# Patient Record
Sex: MALE | Race: WHITE | NOT HISPANIC OR LATINO | Employment: FULL TIME | URBAN - METROPOLITAN AREA
[De-identification: names, ages, dates, MRNs, and addresses within clinical notes are randomized per-mention and may not be internally consistent; named-entity substitution may affect disease eponyms.]

---

## 2017-04-09 ENCOUNTER — HOSPITAL ENCOUNTER (EMERGENCY)
Facility: CLINIC | Age: 47
Discharge: HOME | End: 2017-04-09
Attending: EMERGENCY MEDICINE | Admitting: EMERGENCY MEDICINE
Payer: COMMERCIAL

## 2017-04-09 ENCOUNTER — APPOINTMENT (OUTPATIENT)
Dept: GENERAL RADIOLOGY | Facility: CLINIC | Age: 47
End: 2017-04-09
Attending: EMERGENCY MEDICINE
Payer: COMMERCIAL

## 2017-04-09 VITALS
RESPIRATION RATE: 18 BRPM | SYSTOLIC BLOOD PRESSURE: 139 MMHG | WEIGHT: 235 LBS | BODY MASS INDEX: 31 KG/M2 | TEMPERATURE: 98.2 F | DIASTOLIC BLOOD PRESSURE: 85 MMHG | OXYGEN SATURATION: 99 %

## 2017-04-09 DIAGNOSIS — S86.912A STRAIN OF LEFT KNEE, INITIAL ENCOUNTER: ICD-10-CM

## 2017-04-09 PROCEDURE — 73560 X-RAY EXAM OF KNEE 1 OR 2: CPT | Mod: LT

## 2017-04-09 PROCEDURE — 99283 EMERGENCY DEPT VISIT LOW MDM: CPT | Performed by: EMERGENCY MEDICINE

## 2017-04-09 PROCEDURE — 99284 EMERGENCY DEPT VISIT MOD MDM: CPT | Mod: 25

## 2017-04-09 PROCEDURE — 29505 APPLICATION LONG LEG SPLINT: CPT | Mod: LT

## 2017-04-09 ASSESSMENT — ENCOUNTER SYMPTOMS
NUMBNESS: 0
WEAKNESS: 0

## 2017-04-09 NOTE — ED AVS SNAPSHOT
[facility]    [address] [address]    Phone:  [phone]    Fax:  [phone]                                       Joe Mata   MRN: [id number]    Department:  [facility]   Date of Visit:  4/9/2017           Patient Information     Date Of Birth          1970        Your diagnoses for this visit were:     Strain of left knee, initial encounter        You were seen by Slava Germain MD.        Discharge Instructions         Self-Care for Strains and Sprains  Most minor strains and sprains can be treated with self-care. Recovering from a strain or sprain may take 6 to 8 weeks. Your self-care goal is to reduce pain and immobilize the injury to speed healing.     A sprain injures ligaments (tissue that connects bones to bones).        A strain injures muscles or tendons (tissue that connects muscles to bones).   Support the injured area  Wrapping the injured area provides support for short, necessary activities. Be careful not to wrap the area too tightly. This could cut off the blood supply.    Support a wrist, elbow, or shoulder with a sling.    Wrap an ankle or knee with an elastic bandage.    Tape a finger or toe to the one next to it.  Use cold and heat  Cold reduces swelling. Both cold and heat reduce pain. Heat should not be used in the initial treatment of the injury. When using cold or heat, always place a towel between the pack and your skin.    Apply ice or a cold pack 10 to 15 minutes every hour you re awake for the first 2 days.    After the swelling goes down, use cold or heat to control pain. Don t use heat late in the day, since it can cause swelling when you re not active.  Rest and elevate  Rest and elevation help your injury heal faster.    Raise the injured area above your heart level.    Keep the injured area from moving.    Limit the use of the joint or limb.  Use medicine    Aspirin reduces pain and  swelling. (Note: Don t give aspirin to a child 18 or younger unless prescribed by the doctor.)    Aspirin substitutes, such as ibuprofen, can reduce pain. Some substitutes reduce swelling, too. Ask your pharmacist which substitutes you can use.  Call your doctor if:    The injured joint won t move, or bones make a grating sound when they move.    You can t put weight on the injured area, even after 24 hours.    The injured body part is cold, blue, or numb.    The joint or limb appears bent or crooked.    Pain increases or doesn t improve in 4 days.    When pressing along the injured area, you notice a spot that is especially painful.     4701-4167 [employer]. [address] [place] [address], [place] [address]. All rights reserved. This information is not intended as a substitute for professional medical care. Always follow your healthcare professional's instructions.          Discharge References/Attachments     KNEE SPRAIN (ENGLISH)      24 Hour Appointment Hotline       To make an appointment at any [facility] clinic, call [phone] ([phone]). If you don't have a family doctor or clinic, we will help you find one. [facility] clinics are conveniently located to serve the needs of you and your family.          ED Discharge Orders     Knee Immobilizer           ORTHO  REFERRAL       [facility] is referring you to the Orthopedic  Services at [facility].       The  Representative will assist you in the coordination of your Orthopedic and Musculoskeletal Care as prescribed by your physician.    The  Representative will call you within 1 business day to help schedule your appointment, or you may contact the  Representative at:    All areas ~ [phone]     Type of Referral : Non Surgical       Timeframe requested: Routine    Coverage of these services is subject to the terms and limitations of your health insurance plan.   Please call member services at your health plan with any benefit or coverage questions.      If X-rays, CT or MRI's have been performed, please contact the facility where they were done to arrange for , prior to your scheduled appointment.  Please bring this referral request to your appointment and present it to your specialist.                     Review of your medicines      Notice     You have not been prescribed any medications.            Procedures and tests performed during your visit     XR Knee Left 1/2 Views      Orders Needing Specimen Collection     None      Pending Results     Date and Time Order Name Status Description    4/9/2017 2032 XR Knee Left 1/2 Views Preliminary             Pending Culture Results     No orders found from 4/7/2017 to 4/10/2017.            Test Results From Your Hospital Stay        4/9/2017  8:54 PM      Narrative     KNEE LEFT ONE - TWO VIEW   4/9/2017 8:47 PM     HISTORY: Pain, trauma.    COMPARISON: Left knee x-rays dated 7/28/2009 and left knee MRI dated  8/4/2009.    FINDINGS: Well-corticated enthesopathic changes along the distal  patellar tendon are noted. Mild enthesopathic spurring of the  quadriceps tendon insertion into the patella are noted. These have  progressed since the prior study. Fabella is again seen. Joint spaces  are grossly well-maintained. There is suggestion of a tiny knee joint  effusion.        Impression     IMPRESSION:   1. Chronic findings of the knee as described.  2. Possible small knee joint effusion.  3. No acute fracture or malalignment is seen.                Thank you for choosing [facility]       Thank you for choosing [facility] for your care. Our goal is always to provide you with excellent care. Hearing back from our patients is one way we can continue to improve our services. Please take a few minutes to complete the written survey that you may receive in the mail after you visit with us. Thank you!        MyChart Information      "[employer] lets you send messages to your doctor, view your test results, renew your prescriptions, schedule appointments and more. To sign up, go to [email or web address] . Click on \"Log in\" on the left side of the screen, which will take you to the Welcome page. Then click on \"Sign up Now\" on the right side of the page.     You will be asked to enter the access code listed below, as well as some personal information. Please follow the directions to create your username and password.     Your access code is: [email or web address]  Expires: 2017  9:11 PM     Your access code will  in 90 days. If you need help or a new code, please call your [place] clinic or [phone].        Care EveryWhere ID     This is your Care EveryWhere ID. This could be used by other organizations to access your [facility] [place] medical records  [id number]        After Visit Summary       This is your record. Keep this with you and show to your community pharmacist(s) and doctor(s) at your next visit.                  "

## 2017-04-09 NOTE — ED AVS SNAPSHOT
[facility] Emergency Department    [address] [address]    Phone:  [phone]    Fax:  [phone]                                       Joe Mata   MRN: [id number]    Department:  [facility]   Date of Visit:  4/9/2017           After Visit Summary Signature Page     I have received my discharge instructions, and my questions have been answered. I have discussed any challenges I see with this plan with the nurse or doctor.    ..........................................................................................................................................  Patient/Patient Representative Signature      ..........................................................................................................................................  Patient Representative Print Name and Relationship to Patient    ..................................................               ................................................  Date                                            Time    ..........................................................................................................................................  Reviewed by Signature/Title    ...................................................              ..............................................  Date                                                            Time

## 2017-04-09 NOTE — LETTER
[facility] EMERGENCY DEPARTMENT  [address] [address]  [phone]  Dept: [phone]      4/9/2017    Re: Joe JOHNSON Mata      TO WHOM IT MAY CONCERN:    Joe Mata  was seen on Sunday 4/9/17.  Please excuse him him from work as needed or allow limited/light duty as needed in the next 1 week with use of a knee immobilizer and crutches as needed, due to injury.    Cordially,            [facility] EMERGENCY DEPARTMENT

## 2017-04-10 NOTE — DISCHARGE INSTRUCTIONS
Self-Care for Strains and Sprains  Most minor strains and sprains can be treated with self-care. Recovering from a strain or sprain may take 6 to 8 weeks. Your self-care goal is to reduce pain and immobilize the injury to speed healing.     A sprain injures ligaments (tissue that connects bones to bones).        A strain injures muscles or tendons (tissue that connects muscles to bones).   Support the injured area  Wrapping the injured area provides support for short, necessary activities. Be careful not to wrap the area too tightly. This could cut off the blood supply.    Support a wrist, elbow, or shoulder with a sling.    Wrap an ankle or knee with an elastic bandage.    Tape a finger or toe to the one next to it.  Use cold and heat  Cold reduces swelling. Both cold and heat reduce pain. Heat should not be used in the initial treatment of the injury. When using cold or heat, always place a towel between the pack and your skin.    Apply ice or a cold pack 10 to 15 minutes every hour you re awake for the first 2 days.    After the swelling goes down, use cold or heat to control pain. Don t use heat late in the day, since it can cause swelling when you re not active.  Rest and elevate  Rest and elevation help your injury heal faster.    Raise the injured area above your heart level.    Keep the injured area from moving.    Limit the use of the joint or limb.  Use medicine    Aspirin reduces pain and swelling. (Note: Don t give aspirin to a child 18 or younger unless prescribed by the doctor.)    Aspirin substitutes, such as ibuprofen, can reduce pain. Some substitutes reduce swelling, too. Ask your pharmacist which substitutes you can use.  Call your doctor if:    The injured joint won t move, or bones make a grating sound when they move.    You can t put weight on the injured area, even after 24 hours.    The injured body part is cold, blue, or numb.    The joint or limb appears bent or crooked.    Pain increases  or doesn t improve in 4 days.    When pressing along the injured area, you notice a spot that is especially painful.     1276-7196 [employer]. [address] [place] [address], [place] [address]. All rights reserved. This information is not intended as a substitute for professional medical care. Always follow your healthcare professional's instructions.

## 2017-04-10 NOTE — ED PROVIDER NOTES
History     Chief Complaint   Patient presents with     Knee Pain     left knee pain, unable to walk injured playing hockey tonight.     HPI  Joe Mata is a 46 year old male who injured his left posterior lateral knee area when he squatted and then made a forceful lunging move forward while playing hockey this evening.  He felt a pop and has a sharp severe pain exacerbated by knee flexion and walking.  Pain is nonradiating and improved with rest.  No associated swelling.  No CMS abnormality.  No proximal leg pain or injury and no distal leg pain or injury.     I have reviewed the Medications, Allergies, Past Medical and Surgical History, and Social History in the Epic system.  Patient Active Problem List   Diagnosis     CARDIOVASCULAR SCREENING; LDL GOAL LESS THAN 160     History reviewed. No pertinent past medical history.  Past Surgical History:   Procedure Laterality Date     PAST SURGICAL HISTORY  1992    achilles tendon left     PAST SURGICAL HISTORY  1997    wisdom teeth     No current facility-administered medications for this encounter.      No current outpatient prescriptions on file.     No Known Allergies  Social History   Substance Use Topics     Smoking status: Former Smoker     Smokeless tobacco: Not on file      Comment: 2004/7     Alcohol use Yes      Comment: occ     Family History   Problem Relation Age of Onset     CEREBROVASCULAR DISEASE Paternal Grandfather      Allergies Son        Review of Systems   Skin: Negative.    Neurological: Negative for weakness and numbness.       Physical Exam   BP: 139/85  Heart Rate: 66  Temp: 98.2  F (36.8  C)  Resp: 18  Weight: 106.6 kg (235 lb)  SpO2: 99 %    Physical Exam   Constitutional: He appears well-developed and well-nourished. No distress.   Cardiovascular: Normal rate, regular rhythm and intact distal pulses.    Musculoskeletal: He exhibits tenderness (left posterior lateral knee area as diagrammed). He exhibits no edema or deformity.         Left knee: He exhibits decreased range of motion. He exhibits no swelling, no effusion, no ecchymosis, no deformity, no erythema, normal alignment, no LCL laxity, normal patellar mobility, no bony tenderness and no MCL laxity. No medial joint line, no lateral joint line, no MCL, no LCL and no patellar tendon tenderness noted.        Legs:  Skin: Skin is warm and dry. No rash noted. No erythema. No pallor.   Psychiatric: He has a normal mood and affect. His behavior is normal.   Nursing note and vitals reviewed.      ED Course     ED Course     Procedures        Results for orders placed or performed during the hospital encounter of 04/09/17   XR Knee Left 1/2 Views    Narrative    KNEE LEFT ONE - TWO VIEW   4/9/2017 8:47 PM     HISTORY: Pain, trauma.    COMPARISON: Left knee x-rays dated 7/28/2009 and left knee MRI dated  8/4/2009.    FINDINGS: Well-corticated enthesopathic changes along the distal  patellar tendon are noted. Mild enthesopathic spurring of the  quadriceps tendon insertion into the patella are noted. These have  progressed since the prior study. Fabella is again seen. Joint spaces  are grossly well-maintained. There is suggestion of a tiny knee joint  effusion.      Impression    IMPRESSION:   1. Chronic findings of the knee as described.  2. Possible small knee joint effusion.  3. No acute fracture or malalignment is seen.    FUAD VALDOVINOS MD     I independently reviewed the X-rays: Agree with the Radiologist's interpretation.         Labs Ordered and Resulted from Time of ED Arrival Up to the Time of Departure from the ED - No data to display    Patient has crutches at home that he will use.    Left knee was splinted and immobilized with a knee immobilizer.    Assessments & Plan (with Medical Decision Making)   Left posterior lateral knee area strain probably secondary to biceps femoris muscle and/or tendon strain.  X-rays negative.  Knee was placed in a knee immobilizer and he has crutches to use  at home.  He will use NSAID and declined an opiate analgesic.  Orthopedic  referral was made for him.    I have reviewed the nursing notes.    I have reviewed the findings, diagnosis, plan and need for follow up with the patient.    There are no discharge medications for this patient.      Final diagnoses:   Strain of left knee, initial encounter       4/9/2017   [facility] EMERGENCY DEPARTMENT     Slava Germain MD  04/10/17 0000

## 2017-04-10 NOTE — ED NOTES
"Sudden onset pain behind L knee while skating felt \"pop\" behind knee  Pain increases with movement of foot/snkle can not straighten leg completely feels better bent  Denies any other pain or discomfort no fall involved  No numbness or tingling in leg  "

## 2017-04-11 ENCOUNTER — OFFICE VISIT (OUTPATIENT)
Dept: ORTHOPEDICS | Facility: CLINIC | Age: 47
End: 2017-04-11
Payer: COMMERCIAL

## 2017-04-11 VITALS
WEIGHT: 235 LBS | DIASTOLIC BLOOD PRESSURE: 80 MMHG | SYSTOLIC BLOOD PRESSURE: 135 MMHG | BODY MASS INDEX: 31.14 KG/M2 | HEIGHT: 73 IN

## 2017-04-11 DIAGNOSIS — S76.312D HAMSTRING STRAIN, LEFT, SUBSEQUENT ENCOUNTER: Primary | ICD-10-CM

## 2017-04-11 PROCEDURE — 99203 OFFICE O/P NEW LOW 30 MIN: CPT | Performed by: FAMILY MEDICINE

## 2017-04-11 NOTE — MR AVS SNAPSHOT
"              After Visit Summary   2017    Joe Mata    MRN: [id number]           Patient Information     Date Of Birth          1970        Visit Information        Provider Department      2017 2:00 PM Wilder Alberto,  [facility] [place] [facility]        Today's Diagnoses     Hamstring strain, left, subsequent encounter    -  1       Follow-ups after your visit        Who to contact     If you have questions or need follow up information about today's clinic visit or your schedule please contact [facility] directly at [phone].  Normal or non-critical lab and imaging results will be communicated to you by [email or web address], letter or phone within 4 business days after the clinic has received the results. If you do not hear from us within 7 days, please contact the clinic through [email or web address] or phone. If you have a critical or abnormal lab result, we will notify you by phone as soon as possible.  Submit refill requests through [facility] or call your pharmacy and they will forward the refill request to us. Please allow 3 business days for your refill to be completed.          Additional Information About Your Visit        MyChart Information     [facility] lets you send messages to your doctor, view your test results, renew your prescriptions, schedule appointments and more. To sign up, go to [email or web address] . Click on \"Log in\" on the left side of the screen, which will take you to the Welcome page. Then click on \"Sign up Now\" on the right side of the page.     You will be asked to enter the access code listed below, as well as some personal information. Please follow the directions to create your username and password.     Your access code is: [email or web address]  Expires: 2017  9:11 PM     Your access code will  in 90 days. If you need help or a new code, please call your [place] clinic or [phone].        Care EveryWhere ID     " "This is your Care EveryWhere ID. This could be used by other organizations to access your [facility] medical records  [id number]        Your Vitals Were     Height BMI (Body Mass Index)                6' 1\" (1.854 m) 31 kg/m2           Blood Pressure from Last 3 Encounters:   04/11/17 135/80   04/09/17 139/85   11/24/15 114/72    Weight from Last 3 Encounters:   04/11/17 235 lb (106.6 kg)   04/09/17 235 lb (106.6 kg)   11/24/15 245 lb (111.1 kg)              Today, you had the following     No orders found for display       Primary Care Provider Office Phone # Fax #    Jose Fulton [phone]       XXX RETIRED XXX [address] [place] [address]        Thank you!     Thank you for choosing [facility]  for your care. Our goal is always to provide you with excellent care. Hearing back from our patients is one way we can continue to improve our services. Please take a few minutes to complete the written survey that you may receive in the mail after your visit with us. Thank you!             Your Updated Medication List - Protect others around you: Learn how to safely use, store and throw away your medicines at www.disposemymeds.org.      Notice  As of 4/11/2017  2:57 PM    You have not been prescribed any medications.      "

## 2017-04-11 NOTE — PROGRESS NOTES
"Joe Mata  :  1970  DOS: 2017  MRN: [id number]    Sports Medicine Clinic Visit    PCP: Jose Fulton    Joe Mata is a 46 year old male who is seen as an ER referral presenting with left knee injury.    Injury: Playing hockey - power skating, came up from squat position and felt \"pop\" sensation followed decreased WB tolerance ~ 3 days ago (17).  Pain located over left deep posterior, lateral knee, nonradiating.  Additional Features:  Positive: swelling, instability and weakness.  Symptoms are better with Ibuprofen, Rest and walking with knee straight.  Symptoms are worse with: walking, stairs, going from sit to stand position.  Other evaluation and/or treatments so far consists of: Ice, Ibuprofen, Rest and ER visit.  Recent imaging completed: X-rays completed 17.  Prior History of related problems: Previous h/o left knee arthroscopy ~ 5+ years ago by Dr Esquivel.    Social History: works as [employer]      Review of Systems  Musculoskeletal: as above  Remainder of review of systems is negative including constitutional, CV, pulmonary, GI, Skin and Neurologic except as noted in HPI or medical history.    No past medical history on file.  Past Surgical History:   Procedure Laterality Date     PAST SURGICAL HISTORY      achilles tendon left     PAST SURGICAL HISTORY      wisdom teeth       Objective  /80  Ht 6' 1\" (1.854 m)  Wt 235 lb (106.6 kg)  BMI 31 kg/m2    General: healthy, alert and in no distress    HEENT: no scleral icterus or conjunctival erythema   Skin: no suspicious lesions or rash. No jaundice.   CV: regular rhythm by palpation, 2+ distal pulses, no pedal edema    Resp: normal respiratory effort without conversational dyspnea   Psych: normal mood and affect    Gait: mildly antalgic, appropriate coordination and balance   Neuro: normal light touch sensory exam of the extremities. Motor strength as noted below     Left Knee " exam    ROM:        Full active and passive ROM with flexion and extension       Pain with terminal extension     Inspection:       ecchymosis noted posterior knee and upper calf area       edema noted posterolateral distal thigh and knee area, mild    Skin:       no visible deformities       well perfused       capillary refill brisk    Patellar Motion:        Crepitus noted in the patellofemoral joint    Tender:        Lateral (biceps) hamstring tendon and distal muscle belly    Non Tender:         remainder of knee area        medial patellar border        lateral patellar border        medial joint line        lateral joint line        along MCL        infrapatellar tendon        tibial tubercle       pes anserine bursa    Decreased strength due to pain with active knee flexion over posterolateral knee    Special Tests:        neg (-) Licha       neg (-) Lachman       neg (-) anterior drawer       neg (-) posterior drawer       neg (-) varus at 0 deg and 30 deg       neg (-) valgus at 0 deg and 30 deg    Evaluation of ipsilateral kinetic chain       normal strength with hip extension and abduction      Radiology  KNEE LEFT ONE - TWO VIEW 4/9/2017 8:47 PM      HISTORY: Pain, trauma.     COMPARISON: Left knee x-rays dated 7/28/2009 and left knee MRI dated  8/4/2009.     FINDINGS: Well-corticated enthesopathic changes along the distal  patellar tendon are noted. Mild enthesopathic spurring of the  quadriceps tendon insertion into the patella are noted. These have  progressed since the prior study. Fabella is again seen. Joint spaces  are grossly well-maintained. There is suggestion of a tiny knee joint  effusion.         IMPRESSION:   1. Chronic findings of the knee as described.  2. Possible small knee joint effusion.  3. No acute fracture or malalignment is seen.    Assessment:  1. Hamstring strain, left, subsequent encounter        Plan:  Discussed the assessment with the patient.  Follow up: 2-4 weeks  prn  Likely grade II biceps femoris strain  RICE reviewed  Anticipate 4-6 week recovery  HEP provided for after acute phase  Consider advanced imaging for weakness, ongoing pain  PT offered today to start in 1-2 weeks, declined for now  Use of knee sleeve vs hamstring sleeve vs compression/ACE wrap reviewed  We discussed modified progressive pain-free activity as tolerated  Home handouts provided and supportive care reviewed  All questions were answered today  Contact us with additional questions or concerns  Signs and sx of concern reviewed      Wilder Alberot DO, CAQ  Primary Care Sports Medicine  [facility]               Disclaimer: This note consists of symbols derived from keyboarding, dictation and/or voice recognition software. As a result, there may be errors in the script that have gone undetected. Please consider this when interpreting information found in this chart.

## 2017-04-11 NOTE — NURSING NOTE
"Chief Complaint   Patient presents with     Knee Pain     left knee injuy > 3 days       Initial /80  Ht 6' 1\" (1.854 m)  Wt 235 lb (106.6 kg)  BMI 31 kg/m2 Estimated body mass index is 31 kg/(m^2) as calculated from the following:    Height as of this encounter: 6' 1\" (1.854 m).    Weight as of this encounter: 235 lb (106.6 kg).  Medication Reconciliation: complete     Tyron Seo ATC  "

## 2018-03-22 ENCOUNTER — OFFICE VISIT (OUTPATIENT)
Dept: FAMILY MEDICINE | Facility: CLINIC | Age: 48
End: 2018-03-22
Payer: COMMERCIAL

## 2018-03-22 ENCOUNTER — TELEPHONE (OUTPATIENT)
Dept: FAMILY MEDICINE | Facility: CLINIC | Age: 48
End: 2018-03-22

## 2018-03-22 ENCOUNTER — RADIANT APPOINTMENT (OUTPATIENT)
Dept: GENERAL RADIOLOGY | Facility: CLINIC | Age: 48
End: 2018-03-22
Attending: FAMILY MEDICINE
Payer: COMMERCIAL

## 2018-03-22 VITALS
BODY MASS INDEX: 33.86 KG/M2 | HEIGHT: 72 IN | SYSTOLIC BLOOD PRESSURE: 108 MMHG | TEMPERATURE: 97.8 F | HEART RATE: 60 BPM | RESPIRATION RATE: 16 BRPM | DIASTOLIC BLOOD PRESSURE: 78 MMHG | WEIGHT: 250 LBS

## 2018-03-22 DIAGNOSIS — M25.512 LEFT SHOULDER PAIN, UNSPECIFIED CHRONICITY: Primary | ICD-10-CM

## 2018-03-22 DIAGNOSIS — M25.512 LEFT SHOULDER PAIN, UNSPECIFIED CHRONICITY: ICD-10-CM

## 2018-03-22 PROCEDURE — 73030 X-RAY EXAM OF SHOULDER: CPT | Mod: LT

## 2018-03-22 PROCEDURE — 99213 OFFICE O/P EST LOW 20 MIN: CPT | Performed by: FAMILY MEDICINE

## 2018-03-22 ASSESSMENT — PAIN SCALES - GENERAL: PAINLEVEL: MODERATE PAIN (4)

## 2018-03-22 NOTE — MR AVS SNAPSHOT
"              After Visit Summary   3/22/2018    Joe Mata    MRN: [id number]           Patient Information     Date Of Birth          1970        Visit Information        Provider Department      3/22/2018 11:20 AM Joey Mg MD [facility]        Today's Diagnoses     Left shoulder pain, unspecified chronicity    -  1      Care Instructions    1. This does look like rotator cuff.    2. Lets proceed with diagnosis .     3. Probable Orth refer.           Follow-ups after your visit        Future tests that were ordered for you today     Open Future Orders        Priority Expected Expires Ordered    MR Shoulder Left w Contrast Routine  3/22/2019 3/22/2018            Who to contact     If you have questions or need follow up information about today's clinic visit or your schedule please contact [facility] directly at [phone].  Normal or non-critical lab and imaging results will be communicated to you by [employer] Well[employer], letter or phone within 4 business days after the clinic has received the results. If you do not hear from us within 7 days, please contact the clinic through [employer] or phone. If you have a critical or abnormal lab result, we will notify you by phone as soon as possible.  Submit refill requests through [employer] or call your pharmacy and they will forward the refill request to us. Please allow 3 business days for your refill to be completed.          Additional Information About Your Visit        MyChart Information     [employer] lets you send messages to your doctor, view your test results, renew your prescriptions, schedule appointments and more. To sign up, go to [email or web address] . Click on \"Log in\" on the left side of the screen, which will take you to the Welcome page. Then click on \"Sign up Now\" on the right side of the page.     You will be asked to enter the access code listed below, as well as some personal information. Please " follow the directions to create your username and password.     Your access code is: [email or web address]  Expires: 2018 11:57 AM     Your access code will  in 90 days. If you need help or a new code, please call your [facility] clinic or [phone].        Care EveryWhere ID     This is your Care EveryWhere ID. This could be used by other organizations to access your [facility] medical records  [id number]        Your Vitals Were     Pulse Temperature Respirations Height BMI (Body Mass Index)       60 97.8  F (36.6  C) (Tympanic) 16 6' (1.829 m) 33.91 kg/m2        Blood Pressure from Last 3 Encounters:   18 108/78   17 135/80   17 139/85    Weight from Last 3 Encounters:   18 250 lb (113.4 kg)   17 235 lb (106.6 kg)   17 235 lb (106.6 kg)               Primary Care Provider Office Phone # Fax #    Anh Hatfield [phone] [address] [place] [phone]        Equal Access to Services     ATIF YEE AH: Hadii michael perez hadasho Sotaylaali, waaxda luqadaha, qaybta kaalmada adeegyada, saroj pierre. So [facility] [phone].    ATENCIÓN: Si habla español, tiene a bradshaw disposición servicios gratuitos de asistencia lingüística. Ll[facility] [phone].    We comply with applicable federal civil rights laws and [place] laws. We do not discriminate on the basis of race, color, national origin, age, disability, sex, sexual orientation, or gender identity.            Thank you!     Thank you for choosing [facility]  for your care. Our goal is always to provide you with excellent care. Hearing back from our patients is one way we can continue to improve our services. Please take a few minutes to complete the written survey that you may receive in the mail after your visit with us. Thank you!             Your Updated Medication List - Protect others around you: Learn how to safely use, store and throw away your  medicines at www.disposemymeds.org.      Notice  As of 3/22/2018 12:00 PM    You have not been prescribed any medications.

## 2018-03-22 NOTE — NURSING NOTE
Chief Complaint   Patient presents with     Shoulder left     pain       Initial /78 (BP Location: Right arm)  Pulse 60  Temp 97.8  F (36.6  C) (Tympanic)  Resp 16  Ht 6' (1.829 m)  Wt 250 lb (113.4 kg)  BMI 33.91 kg/m2 Estimated body mass index is 33.91 kg/(m^2) as calculated from the following:    Height as of this encounter: 6' (1.829 m).    Weight as of this encounter: 250 lb (113.4 kg).      Health Maintenance that is potentially due pending provider review:  NONE    n/a    Is there anyone who you would like to be able to receive your results? No  If yes have patient fill out YADIRA

## 2018-03-22 NOTE — PROGRESS NOTES
SUBJECTIVE:   Joe Mata is a 47 year old male who presents to clinic today for the following health issues:      Musculoskeletal problem/pain;   No history of trauma.  Decreased ROM and chronic pain and weakness.       Duration: 2 months    Description  Location: left shoulder    Intensity:  5/10 8/10 with motion    Accompanying signs and symptoms: weakness    History  Previous similar problem: no   Previous evaluation:  none    Precipitating or alleviating factors:  Trauma or overuse: no   Aggravating factors include: movement    Therapies tried and outcome: rest/inactivity and ice          Problem list and histories reviewed & adjusted, as indicated.  Additional history: as documented    Patient Active Problem List   Diagnosis     CARDIOVASCULAR SCREENING; LDL GOAL LESS THAN 160     Past Surgical History:   Procedure Laterality Date     PAST SURGICAL HISTORY  1992    achilles tendon left     PAST SURGICAL HISTORY  1997    wisdom teeth       Social History   Substance Use Topics     Smoking status: Former Smoker     Smokeless tobacco: Never Used      Comment: 2004/7     Alcohol use Yes      Comment: occ     Family History   Problem Relation Age of Onset     CEREBROVASCULAR DISEASE Paternal Grandfather      Allergies Son          No current outpatient prescriptions on file.       Reviewed and updated as needed this visit by clinical staff  Allergies  Meds       Reviewed and updated as needed this visit by Provider         ROS:  CONSTITUTIONAL: NEGATIVE for fever, chills, change in weight  ENT/MOUTH: NEGATIVE for ear, mouth and throat problems  RESP: NEGATIVE for significant cough or SOB  CV: NEGATIVE for chest pain, palpitations or peripheral edema    OBJECTIVE:     /78 (BP Location: Right arm)  Pulse 60  Temp 97.8  F (36.6  C) (Tympanic)  Resp 16  Ht 6' (1.829 m)  Wt 250 lb (113.4 kg)  BMI 33.91 kg/m2  Body mass index is 33.91 kg/(m^2).  GENERAL: healthy, alert and no distress  NECK: no  adenopathy, no asymmetry, masses, or scars and thyroid normal to palpation  MS:  LEFT SHOULDER WITH MARKED DECREASE IN ABDUCTION.  MILD WEAKNESS OF ROTATOR CUFF                  ASSESSMENT/PLAN:             1. Left shoulder pain, unspecified chronicity    - XR Shoulder Left G/E 3 Views; Future  - MR Shoulder Left w Contrast; Future    ASSESSMENT/PLAN:      ICD-10-CM    1. Left shoulder pain, unspecified chronicity M25.512 XR Shoulder Left G/E 3 Views     MR Shoulder Left w Contrast       Patient Instructions   1. This does look like rotator cuff.    2. Lets proceed with diagnosis .     3. Probable Orth refer.           Joey Mg MD  [facility]

## 2018-03-22 NOTE — PATIENT INSTRUCTIONS
1. This does look like rotator cuff.    2. Lets proceed with diagnosis .     3. Probable Orth refer.

## 2018-03-30 ENCOUNTER — HOSPITAL ENCOUNTER (OUTPATIENT)
Dept: MRI IMAGING | Facility: CLINIC | Age: 48
Discharge: HOME | End: 2018-03-30
Attending: FAMILY MEDICINE | Admitting: FAMILY MEDICINE
Payer: COMMERCIAL

## 2018-03-30 DIAGNOSIS — M25.512 LEFT SHOULDER PAIN, UNSPECIFIED CHRONICITY: ICD-10-CM

## 2018-03-30 PROCEDURE — 73221 MRI JOINT UPR EXTREM W/O DYE: CPT | Mod: LT

## 2018-04-05 ENCOUNTER — OFFICE VISIT (OUTPATIENT)
Dept: FAMILY MEDICINE | Facility: CLINIC | Age: 48
End: 2018-04-05
Payer: COMMERCIAL

## 2018-04-05 VITALS
WEIGHT: 250 LBS | HEIGHT: 72 IN | HEART RATE: 53 BPM | BODY MASS INDEX: 33.86 KG/M2 | RESPIRATION RATE: 18 BRPM | TEMPERATURE: 97.6 F | SYSTOLIC BLOOD PRESSURE: 115 MMHG | DIASTOLIC BLOOD PRESSURE: 73 MMHG

## 2018-04-05 DIAGNOSIS — M19.019 ACROMIOCLAVICULAR JOINT ARTHRITIS: Primary | ICD-10-CM

## 2018-04-05 PROCEDURE — 20610 DRAIN/INJ JOINT/BURSA W/O US: CPT | Mod: LT | Performed by: FAMILY MEDICINE

## 2018-04-05 PROCEDURE — 99213 OFFICE O/P EST LOW 20 MIN: CPT | Mod: 25 | Performed by: FAMILY MEDICINE

## 2018-04-05 NOTE — MR AVS SNAPSHOT
"              After Visit Summary   2018    Joe Mata    MRN: [id number]           Patient Information     Date Of Birth          1970        Visit Information        Provider Department      2018 9:40 AM Joey Mg MD [facility]        Care Instructions    1. Limit the lifting for one month    2. Lets do injection and see if this helps    3. If not a good deal better in one month come back for PT.     4. Resume lifting after one month.              Follow-ups after your visit        Who to contact     If you have questions or need follow up information about today's clinic visit or your schedule please contact [facility] directly at [phone].  Normal or non-critical lab and imaging results will be communicated to you by MyChart, letter or phone within 4 business days after the clinic has received the results. If you do not hear from us within 7 days, please contact the clinic through [employer] or phone. If you have a critical or abnormal lab result, we will notify you by phone as soon as possible.  Submit refill requests through [employer] or call your pharmacy and they will forward the refill request to us. Please allow 3 business days for your refill to be completed.          Additional Information About Your Visit        MyChart Information     [employer] lets you send messages to your doctor, view your test results, renew your prescriptions, schedule appointments and more. To sign up, go to [email or web address] . Click on \"Log in\" on the left side of the screen, which will take you to the Welcome page. Then click on \"Sign up Now\" on the right side of the page.     You will be asked to enter the access code listed below, as well as some personal information. Please follow the directions to create your username and password.     Your access code is: [email or web address]  Expires: 2018 11:57 AM     Your access code will  in 90 days. If " you need help or a new code, please call your [facility] or [phone].        Care EveryWhere ID     This is your Care EveryWhere ID. This could be used by other organizations to access your [facility] medical records  [id number]        Your Vitals Were     Pulse Temperature Respirations Height BMI (Body Mass Index)       53 97.6  F (36.4  C) (Tympanic) 18 6' (1.829 m) 33.91 kg/m2        Blood Pressure from Last 3 Encounters:   04/05/18 115/73   03/22/18 108/78   04/11/17 135/80    Weight from Last 3 Encounters:   04/05/18 250 lb (113.4 kg)   03/22/18 250 lb (113.4 kg)   04/11/17 235 lb (106.6 kg)              Today, you had the following     No orders found for display       Primary Care Provider Office Phone # Fax #    Anh Hatfield [phone] [address] [place] [address]        Equal Access to Services     ATIF YEE : Hadii aad ku hadasho Soomaali, waaxda luqadaha, qaybta kaalmada adeegyada, waxvalente ellington . So [facility] [phone].    ATENCIÓN: Si habla español, tiene a bradshaw disposición servicios gratuitos de asistencia lingüística. Llame al [phone].    We comply with applicable federal civil rights laws and [place] laws. We do not discriminate on the basis of race, color, national origin, age, disability, sex, sexual orientation, or gender identity.            Thank you!     Thank you for choosing [facility]  for your care. Our goal is always to provide you with excellent care. Hearing back from our patients is one way we can continue to improve our services. Please take a few minutes to complete the written survey that you may receive in the mail after your visit with us. Thank you!             Your Updated Medication List - Protect others around you: Learn how to safely use, store and throw away your medicines at www.disposemymeds.org.      Notice  As of 4/5/2018 10:09 AM    You have not been prescribed any  medications.

## 2018-04-05 NOTE — NURSING NOTE
Chief Complaint   Patient presents with     Shoulder Pain       Initial /73 (Cuff Size: Adult Regular)  Pulse 53  Temp 97.6  F (36.4  C) (Tympanic)  Resp 18  Ht 6' (1.829 m)  Wt 250 lb (113.4 kg)  BMI 33.91 kg/m2 Estimated body mass index is 33.91 kg/(m^2) as calculated from the following:    Height as of this encounter: 6' (1.829 m).    Weight as of this encounter: 250 lb (113.4 kg).      Health Maintenance that is potentially due pending provider review:  NONE    n/a    Is there anyone who you would like to be able to receive your results? Not Applicable  If yes have patient fill out YADIRA

## 2018-04-05 NOTE — PATIENT INSTRUCTIONS
1. Limit the lifting for one month    2. Lets do injection and see if this helps    3. If not a good deal better in one month come back for PT.     4. Resume lifting after one month.

## 2018-04-05 NOTE — PROGRESS NOTES
SUBJECTIVE:   Joe Mata is a 47 year old male who presents to clinic today for the following health issues:      Musculoskeletal problem/pain he is in today for the follow-up of his MRI of his left shoulder.  On examination today he is tender over his AC joint and that seems to come ply fairly well with what we see on his MR.      Duration: follow up from 3/22/18- still feels about the same    Description  Location: Left shoulder     Intensity:  moderate    Accompanying signs and symptoms: radiation of pain to shoulder and weakness of shoulder    History  Previous similar problem: no   Previous evaluation:  MRI done on 3/30/2018    Precipitating or alleviating factors:  Trauma or overuse: no   Aggravating factors include: movement     Therapies tried and outcome: rest/inactivity and ice, ibuprofen     MR Shoulder Left w/o Contrast    MR SHOULDER LEFT WITHOUT CONTRAST 3/30/2018 2:09 PM     HISTORY:  Left shoulder pain, unspecified chronicity.    TECHNIQUE:  Axial dual echo T2. Coronal T1. Coronal T2 with and  without fat suppression. Sagittal T2.    FINDINGS:  Osseous Acromion Outlet:  There is active AC arthrosis, including mild  inferior hypertrophic change.  This results in mild impression on the  supraspinatus musculotendinous junction region. Type 2 acromion.  No  os acromiale.    Rotator Cuff: Supraspinatus tendon -  mild/moderate tendinosis  anteriorly; no tear identified.  Infraspinatus tendon -  no tear or  significant tendinosis.  Subscapularis tendon -  no tear or  significant tendinosis.  Intact teres minor tendon.  No asymmetric  muscle atrophy.     Labral Structures: No superior labral tear (SLAP lesion) identified.  No labral cyst identified. No anterior or posterior labral tear  identified.    Biceps Tendon: No tear, dislocation, or significant tendinosis.    Osseous and Cartilaginous Structures: Mild resorptive change of the  humeral head.  No bone contusion or fracture. No  glenohumeral  osteoarthritis or apparent chondromalacia identified.    Joint Space: No significant overall joint effusion.    Additional Findings: No deltoid muscle edema. Minimal fluid within the  subacromial-subdeltoid bursa.      Impression    IMPRESSION:  1. Mild/moderate supraspinatus tendinosis anteriorly.  2. AC arthrosis. Minimal subacromial bursal fluid.    ERIN MALIK MD               Problem list and histories reviewed & adjusted, as indicated.  Additional history: as documented    Patient Active Problem List   Diagnosis     CARDIOVASCULAR SCREENING; LDL GOAL LESS THAN 160     Past Surgical History:   Procedure Laterality Date     PAST SURGICAL HISTORY  1992    achilles tendon left     PAST SURGICAL HISTORY  1997    wisdom teeth       Social History   Substance Use Topics     Smoking status: Former Smoker     Smokeless tobacco: Never Used      Comment: 2004/7     Alcohol use Yes      Comment: occ     Family History   Problem Relation Age of Onset     CEREBROVASCULAR DISEASE Paternal Grandfather      Allergies Son          No current outpatient prescriptions on file.       Reviewed and updated as needed this visit by clinical staff       Reviewed and updated as needed this visit by Provider         ROS:  CONSTITUTIONAL: NEGATIVE for fever, chills, change in weight  ENT/MOUTH: NEGATIVE for ear, mouth and throat problems  RESP: NEGATIVE for significant cough or SOB  CV: NEGATIVE for chest pain, palpitations or peripheral edema    OBJECTIVE:     /73 (Cuff Size: Adult Regular)  Pulse 53  Temp 97.6  F (36.4  C) (Tympanic)  Resp 18  Ht 6' (1.829 m)  Wt 250 lb (113.4 kg)  BMI 33.91 kg/m2  Body mass index is 33.91 kg/(m^2).  GENERAL: healthy, alert and no distress  NECK: no adenopathy, no asymmetry, masses, or scars and thyroid normal to palpation  RESP: lungs clear to auscultation - no rales, rhonchi or wheezes  CV: regular rate and rhythm, normal S1 S2, no S3 or S4, no murmur, click or rub, no  peripheral edema and peripheral pulses strong  ABDOMEN: soft, nontender, no hepatosplenomegaly, no masses and bowel sounds normal  MS: no gross musculoskeletal defects noted, no edema  He has tenderness over his AC joint on the left  Procedure; using sterile technique I placed 20 mg of Kenalog into his left AC joint area without difficulty this was accompanied with 2 cc of 1% Xylocaine      ASSESSMENT/PLAN:       ASSESSMENT/PLAN:      ICD-10-CM    1. Acromioclavicular joint arthritis M19.019        Patient Instructions   1. Limit the lifting for one month    2. Lets do injection and see if this helps    3. If not a good deal better in one month come back for PT.     4. Resume lifting after one month.                There are no diagnoses linked to this encounter.        Joey Mg MD  [facility]

## 2023-02-01 ENCOUNTER — OFFICE VISIT (OUTPATIENT)
Dept: URGENT CARE | Facility: URGENT CARE | Age: 53
End: 2023-02-01
Payer: OTHER GOVERNMENT

## 2023-02-01 VITALS
OXYGEN SATURATION: 100 % | WEIGHT: 223 LBS | DIASTOLIC BLOOD PRESSURE: 77 MMHG | SYSTOLIC BLOOD PRESSURE: 129 MMHG | BODY MASS INDEX: 30.24 KG/M2 | TEMPERATURE: 97.7 F | HEART RATE: 49 BPM

## 2023-02-01 DIAGNOSIS — L03.116 CELLULITIS OF LEFT LOWER EXTREMITY: Primary | ICD-10-CM

## 2023-02-01 PROCEDURE — 99203 OFFICE O/P NEW LOW 30 MIN: CPT

## 2023-02-01 RX ORDER — CEPHALEXIN 500 MG/1
500 CAPSULE ORAL 2 TIMES DAILY
Qty: 14 CAPSULE | Refills: 0 | Status: SHIPPED | OUTPATIENT
Start: 2023-02-01 | End: 2023-02-08

## 2023-02-01 NOTE — PROGRESS NOTES
Assessment & Plan   (L03.116) Cellulitis of left lower extremity  (primary encounter diagnosis)  Plan: cephALEXin (KEFLEX) 500 MG capsule    Cellulitis, discharge instructions for patient instructions discussed and provided.  Informed the patient to take the antibiotic as prescribed and finish the full course even if symptoms improve.  We discussed also trying yogurt with active cultures or probiotic such as Culturelle daily to help prevent diarrhea while taking the antibiotic.  Instructed him to monitor for any increase in redness, swelling, pain, warmth to touch, fever/chills and/or nausea/vomiting and advised that he return to clinic if any of these occur.  Patient acknowledged his understanding of the above plan.    CHARLEY Mejia CNP  [facility]    Fabi Diaz is a 52 year old male who presents to clinic today for the following health issues:  Chief Complaint   Patient presents with     Leg Swelling     Left leg swelling, got a tattoo on left leg, little red. Says feels like his leg is swollen today.      HPI  The patient reports seeing redness on the left lower leg Sunday night.  Earlier today the patient's wife noticed his left lower leg was more swollen than the right.      Review of Systems  Negative except noted above.       Objective    /77   Pulse (!) 49   Temp 97.7  F (36.5  C) (Tympanic)   Wt 101.2 kg (223 lb)   SpO2 100%   BMI 30.24 kg/m    Physical Exam   GENERAL: healthy, alert and no distress  SKIN: tattoo - arms and lower legs and erythema, edema and area of skin around the left lower leg tattoo that is warmer than the other surrounding tissue

## 2023-02-01 NOTE — PATIENT INSTRUCTIONS
Take the antibiotic as prescribed and finish the full course even if symptoms improve.  Try yogurt with active cultures or probiotics such as Culturelle daily to help prevent diarrhea while using antibiotics.  Monitor for any increase in redness, swelling, pain, warmth to touch, fever/chills and/or nausea/vomiting.  Return to clinic if any of these occur.

## 2023-04-14 ENCOUNTER — APPOINTMENT (OUTPATIENT)
Dept: GENERAL RADIOLOGY | Facility: CLINIC | Age: 53
End: 2023-04-14
Attending: PHYSICIAN ASSISTANT
Payer: OTHER GOVERNMENT

## 2023-04-14 ENCOUNTER — HOSPITAL ENCOUNTER (EMERGENCY)
Facility: CLINIC | Age: 53
Discharge: HOME | End: 2023-04-14
Attending: PHYSICIAN ASSISTANT | Admitting: PHYSICIAN ASSISTANT
Payer: OTHER GOVERNMENT

## 2023-04-14 VITALS
RESPIRATION RATE: 18 BRPM | HEART RATE: 55 BPM | TEMPERATURE: 98 F | OXYGEN SATURATION: 98 % | SYSTOLIC BLOOD PRESSURE: 123 MMHG | DIASTOLIC BLOOD PRESSURE: 74 MMHG

## 2023-04-14 DIAGNOSIS — S46.912A MUSCLE STRAIN OF LEFT UPPER ARM, INITIAL ENCOUNTER: ICD-10-CM

## 2023-04-14 PROCEDURE — 99213 OFFICE O/P EST LOW 20 MIN: CPT | Performed by: PHYSICIAN ASSISTANT

## 2023-04-14 PROCEDURE — G0463 HOSPITAL OUTPT CLINIC VISIT: HCPCS | Performed by: PHYSICIAN ASSISTANT

## 2023-04-14 PROCEDURE — 73060 X-RAY EXAM OF HUMERUS: CPT | Mod: LT

## 2023-04-14 NOTE — ED PROVIDER NOTES
History     Chief Complaint   Patient presents with     Arm Injury     Left arm. Patient was changing a tire - muscle stretched and popped. Hurts worse with holding something or rotating the arm.      HPI  Joe Mata is a 52 year old right-hand-dominant male who presents to urgent care with concern over left arm pain after injury yesterday.  Patient reports that he was lifting a tire approximately 150 pounds when he felt a popping sensation in his left upper arm.  Since then complains of mild to moderate pain which is exacerbated by flexion of his elbow, pronation supination of his forearm.  He is unaware of any ecchymosis, lacerations or abrasions.  No deformity per his report.  No distal numbness or paresthesias    Allergies:  No Known Allergies    Problem List:    Patient Active Problem List    Diagnosis Date Noted     CARDIOVASCULAR SCREENING; LDL GOAL LESS THAN 160 10/31/2010     Priority: Medium      Past Medical History:    No past medical history on file.    Past Surgical History:    Past Surgical History:   Procedure Laterality Date     PAST SURGICAL HISTORY  1992    achilles tendon left     PAST SURGICAL HISTORY  1997    wisdom teeth     Family History:    Family History   Problem Relation Age of Onset     Cerebrovascular Disease Paternal Grandfather      Allergies Son      Social History:  Marital Status:   [2]  Social History     Tobacco Use     Smoking status: Former     Smokeless tobacco: Never     Tobacco comments:     2004/7   Substance Use Topics     Alcohol use: Yes     Comment: occ     Drug use: No      Medications:    No current outpatient medications on file.    Review of Systems  CONSTITUTIONAL:NEGATIVE for fever, chills, change in weight  INTEGUMENTARY/SKIN: NEGATIVE for worrisome rashes, moles or lesions  RESP:NEGATIVE for significant cough or SOB  MUSCULOSKELETAL: POSITIVE  For left upper arm pain and NEGATIVE for other concerning arthralgias or myalgias   NEURO: NEGATIVE for  numbness, paresthesias   Physical Exam   BP: 123/74  Pulse: 55  Temp: 98  F (36.7  C)  Resp: 18  SpO2: 98 %  Physical Exam  Constitutional:       General: He is not in acute distress.     Appearance: He is not ill-appearing or toxic-appearing.   HENT:      Head: Normocephalic and atraumatic.   Cardiovascular:      Pulses:           Radial pulses are 2+ on the left side.   Musculoskeletal:      Left shoulder: Normal.      Left upper arm: Tenderness present. No swelling, edema, deformity or bony tenderness.      Left elbow: Normal.   Skin:     General: Skin is warm and dry.      Findings: No abrasion, ecchymosis, erythema, laceration or rash.   Neurological:      Mental Status: He is alert.      Sensory: No sensory deficit.       ED Course           Procedures       Critical Care time:  none        No results found for this or any previous visit (from the past 24 hour(s)).    Medications - No data to display    Assessments & Plan (with Medical Decision Making)     I have reviewed the nursing notes.    I have reviewed the findings, diagnosis, plan and need for follow up with the patient.       There are no discharge medications for this patient.    Final diagnoses:   Muscle strain of left upper arm, initial encounter     52-year-old male presents to urgent care with concern over sudden onset of left upper arm pain after attempting to lift a heavy object yesterday.  He had stable vital signs upon arrival.  Physical exam findings were significant for localized tenderness location of the lower half of the left upper arm.  He had x-ray which was negative for acute fracture, bony abnormality.  Symptoms most consistent with muscle strain.  Discussed with patient cannot definitively rule out partial biceps tendon injury however no Efrem deformity at this time.  He was discharged home with instructions for rest, ice/heat, Tylenol/ibuprofen.  Follow-up with ortho/sports medicine if no improvement in 5-7 days.  Worrisome  reasons to return to ER/UC sooner discussed.     Disclaimer: This note consists of symbols derived from keyboarding, dictation, and/or voice recognition software. As a result, there may be errors in the script that have gone undetected.  Please consider this when interpreting information found in the chart.    4/14/2023   [facility] EMERGENCY DEPT     Bren Wright PA-C  04/16/23 0756

## 2023-05-04 ENCOUNTER — OFFICE VISIT (OUTPATIENT)
Dept: ORTHOPEDICS | Facility: CLINIC | Age: 53
End: 2023-05-04
Payer: OTHER GOVERNMENT

## 2023-05-04 VITALS
BODY MASS INDEX: 27.9 KG/M2 | WEIGHT: 206 LBS | SYSTOLIC BLOOD PRESSURE: 142 MMHG | DIASTOLIC BLOOD PRESSURE: 94 MMHG | HEIGHT: 72 IN

## 2023-05-04 DIAGNOSIS — S59.902D INJURY OF LEFT ELBOW, SUBSEQUENT ENCOUNTER: ICD-10-CM

## 2023-05-04 DIAGNOSIS — M79.18 BRACHIORADIALIS MUSCLE TENDERNESS: ICD-10-CM

## 2023-05-04 DIAGNOSIS — S46.292D: Primary | ICD-10-CM

## 2023-05-04 PROCEDURE — 99203 OFFICE O/P NEW LOW 30 MIN: CPT | Performed by: FAMILY MEDICINE

## 2023-05-04 NOTE — PROGRESS NOTES
"Joe Mata  :  1970  DOS: 2023  MRN: [id number]    Sports Medicine Clinic Visit    PCP: Anh Hatfield    Joe Mata is a 52 year old Right hand dominant male who is seen as a self referral presenting with left elbow injury.    Injury: Patient describes injury as lifting large truck tire when noted \"pop\" sensation in left elbow, biceps region that occurred ~ 3 weeks ago (23).  Pain located over left distal biceps, anterior medial elbow, radial head, radiating to left biceps muscle belly.  Additional Features:  Positive: swelling and weakness.  Symptoms are better with Ice and Rest.  Symptoms are worse with: lifting, elbow flexion, pronation.  Other evaluation and/or treatments so far consists of: Ice, Heat, Ibuprofen, Rest and compression sleeve.  Recent imaging completed: X-rays humerus completed 23.  Prior History of related problems: none    Social History: currently employed as     Review of Systems  Musculoskeletal: as above  Remainder of review of systems is negative including constitutional, CV, pulmonary, GI, Skin and Neurologic except as noted in HPI or medical history.    No past medical history on file.  Past Surgical History:   Procedure Laterality Date     PAST SURGICAL HISTORY      achilles tendon left     PAST SURGICAL HISTORY      wisdom teeth     Family History   Problem Relation Age of Onset     Cerebrovascular Disease Paternal Grandfather      Allergies Son          Objective  BP (!) 142/94   Ht 1.829 m (6')   Wt 93.4 kg (206 lb)   BMI 27.94 kg/m        General: healthy, alert and in no distress      HEENT: no scleral icterus or conjunctival erythema     Skin: no suspicious lesions or rash. No jaundice.     CV: regular rhythm by palpation, 2+ distal pulses, no pedal edema      Resp: normal respiratory effort without conversational dyspnea     Psych: normal mood and affect      Gait: nonantalgic, appropriate coordination and balance "     Neuro: normal light touch sensory exam of the extremities. Motor strength as noted below     Left Elbow exam:    Inspection:       no ecchymosis       no edema or effusion    ROM:       full with flexion, extension, forearm supination and pronation.       Mild pain terminal flexion and extension, most painful terminal supination    Strength:       flexion 4/5       extension 5/5       forearm supination 4/5       forearm pronation 5/5    Tender:       Distal biceps muscle belly, distal biceps tendon mild       Mild/moderate brachioradialis, supinator pain with palpation    Non-Tender:       remainder of elbow area       lateral epicondyle       medial epicondyle       radial head       antecubital space    Sensation:       intact throughout hand       intact throughout forearm     Skin:       well perfused       capillary refill less than 2 seconds        Radiology:  Results for orders placed or performed during the hospital encounter of 04/14/23   Humerus XR,  G/E 2 views, left    Narrative    XR HUMERUS LEFT G/E 2 VIEWS 4/14/2023 2:03 PM     HISTORY: pain, swelling after lifting heavy object, assess for bony  abnormality    COMPARISON: None.      Impression    IMPRESSION: No fractures are evident. Normal alignment. No osseous  lesion.    COLIN DE ANDA MD         SYSTEM ID:  [id number]       Assessment:  1. Other injury of muscle, fascia and tendon of other parts of biceps, left arm, subsequent encounter    2. Brachioradialis muscle tenderness    3. Injury of left elbow, subsequent encounter        Plan:  Discussed the assessment with the patient.  Follow up: will contact with MRI results  Elbow injury from lifting a tire, felt a pop and had pain, weakness since  XR images independently visualized and reviewed with patient today in clinic  No significant acute pathology on XR, though not sensitive to soft tissue pathology  Elbow compression provided  Rest from painful activity, avoid lifting more than 1-5  jackynds  MRI ordered for better diagnostic clarity  PT vs surgical referral options will be based on MRI results  We discussed modified progressive pain-free activity as tolerated  Home handouts provided and supportive care reviewed  All questions were answered today  Contact us with additional questions or concerns  Signs and sx of concern reviewed      Wilder Alberto DO, PRASANTH  Sports Medicine Physician  [facility]          Disclaimer: This note consists of symbols derived from keyboarding, dictation and/or voice recognition software. As a result, there may be errors in the script that have gone undetected. Please consider this when interpreting information found in this chart.

## 2023-05-04 NOTE — LETTER
"    2023         RE: Joe Mata  [id number] [address] [facility] [address]        Dear Colleague,    Thank you for referring your patient, Joe Mata, to the [facility] [place]. Please see a copy of my visit note below.    Joe Mata  :  1970  DOS: 2023  MRN: [id number]    Sports Medicine Clinic Visit    PCP: Anh Hatfield    Joe Mata is a 52 year old Right hand dominant male who is seen as a self referral presenting with left elbow injury.    Injury: Patient describes injury as lifting large truck tire when noted \"pop\" sensation in left elbow, biceps region that occurred ~ 3 weeks ago (23).  Pain located over left distal biceps, anterior medial elbow, radial head, radiating to left biceps muscle belly.  Additional Features:  Positive: swelling and weakness.  Symptoms are better with Ice and Rest.  Symptoms are worse with: lifting, elbow flexion, pronation.  Other evaluation and/or treatments so far consists of: Ice, Heat, Ibuprofen, Rest and compression sleeve.  Recent imaging completed: X-rays humerus completed 23.  Prior History of related problems: none    Social History: currently employed as     Review of Systems  Musculoskeletal: as above  Remainder of review of systems is negative including constitutional, CV, pulmonary, GI, Skin and Neurologic except as noted in HPI or medical history.    No past medical history on file.  Past Surgical History:   Procedure Laterality Date     PAST SURGICAL HISTORY      achilles tendon left     PAST SURGICAL HISTORY      wisdom teeth     Family History   Problem Relation Age of Onset     Cerebrovascular Disease Paternal Grandfather      Allergies Son          Objective  BP (!) 142/94   Ht 1.829 m (6')   Wt 93.4 kg (206 lb)   BMI 27.94 kg/m        General: healthy, alert and in no distress      HEENT: no scleral icterus or conjunctival erythema     Skin: no " suspicious lesions or rash. No jaundice.     CV: regular rhythm by palpation, 2+ distal pulses, no pedal edema      Resp: normal respiratory effort without conversational dyspnea     Psych: normal mood and affect      Gait: nonantalgic, appropriate coordination and balance     Neuro: normal light touch sensory exam of the extremities. Motor strength as noted below     Left Elbow exam:    Inspection:       no ecchymosis       no edema or effusion    ROM:       full with flexion, extension, forearm supination and pronation.       Mild pain terminal flexion and extension, most painful terminal supination    Strength:       flexion 4/5       extension 5/5       forearm supination 4/5       forearm pronation 5/5    Tender:       Distal biceps muscle belly, distal biceps tendon mild       Mild/moderate brachioradialis, supinator pain with palpation    Non-Tender:       remainder of elbow area       lateral epicondyle       medial epicondyle       radial head       antecubital space    Sensation:       intact throughout hand       intact throughout forearm     Skin:       well perfused       capillary refill less than 2 seconds        Radiology:  Results for orders placed or performed during the hospital encounter of 04/14/23   Humerus XR,  G/E 2 views, left    Narrative    XR HUMERUS LEFT G/E 2 VIEWS 4/14/2023 2:03 PM     HISTORY: pain, swelling after lifting heavy object, assess for bony  abnormality    COMPARISON: None.      Impression    IMPRESSION: No fractures are evident. Normal alignment. No osseous  lesion.    COLIN DE ANDA MD         SYSTEM ID:  [id number]       Assessment:  1. Other injury of muscle, fascia and tendon of other parts of biceps, left arm, subsequent encounter    2. Brachioradialis muscle tenderness    3. Injury of left elbow, subsequent encounter        Plan:  Discussed the assessment with the patient.  Follow up: will contact with MRI results  Elbow injury from lifting a tire, felt a pop and  had pain, weakness since  XR images independently visualized and reviewed with patient today in clinic  No significant acute pathology on XR, though not sensitive to soft tissue pathology  Elbow compression provided  Rest from painful activity, avoid lifting more than 1-5 pounds  MRI ordered for better diagnostic clarity  PT vs surgical referral options will be based on MRI results  We discussed modified progressive pain-free activity as tolerated  Home handouts provided and supportive care reviewed  All questions were answered today  Contact us with additional questions or concerns  Signs and sx of concern reviewed      Wilder Alberto DO, CAQ  Sports Medicine Physician  [facility]          Disclaimer: This note consists of symbols derived from keyboarding, dictation and/or voice recognition software. As a result, there may be errors in the script that have gone undetected. Please consider this when interpreting information found in this chart.      Again, thank you for allowing me to participate in the care of your patient.        Sincerely,        Wilder Alberto DO

## 2023-05-09 ENCOUNTER — HOSPITAL ENCOUNTER (OUTPATIENT)
Dept: MRI IMAGING | Facility: CLINIC | Age: 53
Discharge: HOME | End: 2023-05-09
Attending: FAMILY MEDICINE | Admitting: FAMILY MEDICINE
Payer: OTHER GOVERNMENT

## 2023-05-09 DIAGNOSIS — S46.292D: ICD-10-CM

## 2023-05-09 DIAGNOSIS — S59.902D INJURY OF LEFT ELBOW, SUBSEQUENT ENCOUNTER: ICD-10-CM

## 2023-05-09 DIAGNOSIS — M79.18 BRACHIORADIALIS MUSCLE TENDERNESS: ICD-10-CM

## 2023-05-09 PROCEDURE — 73221 MRI JOINT UPR EXTREM W/O DYE: CPT | Mod: LT

## 2023-05-09 PROCEDURE — 73221 MRI JOINT UPR EXTREM W/O DYE: CPT | Mod: 26 | Performed by: RADIOLOGY

## 2023-05-10 ENCOUNTER — MYC MEDICAL ADVICE (OUTPATIENT)
Dept: ORTHOPEDICS | Facility: CLINIC | Age: 53
End: 2023-05-10
Payer: OTHER GOVERNMENT

## 2023-05-10 DIAGNOSIS — S46.212D RUPTURE OF LEFT DISTAL BICEPS TENDON, SUBSEQUENT ENCOUNTER: ICD-10-CM

## 2023-05-10 DIAGNOSIS — S46.292D: Primary | ICD-10-CM

## 2023-05-10 NOTE — TELEPHONE ENCOUNTER
Reviewed results of MRI with Dr Alberto.  Patient has near full thickness distal biceps tendon tear.  Given patient's weakness on exam and activity level, would be best for patient to follow up with Ortho Surgery to review options.    [facility] for return call to discuss MRI results.  Results message was sent to patient via [employer].  Ortho  order pending.    Tyron Seo ATC

## 2023-05-10 NOTE — TELEPHONE ENCOUNTER
Dr Alberto spoke with patient.  Ortho  order placed and scheduling info provided.    Tyron Seo, ATC

## 2023-05-15 ENCOUNTER — TELEPHONE (OUTPATIENT)
Dept: ORTHOPEDICS | Facility: CLINIC | Age: 53
End: 2023-05-15
Payer: OTHER GOVERNMENT

## 2023-05-15 ENCOUNTER — MYC MEDICAL ADVICE (OUTPATIENT)
Dept: ORTHOPEDICS | Facility: CLINIC | Age: 53
End: 2023-05-15
Payer: OTHER GOVERNMENT

## 2023-05-15 DIAGNOSIS — S46.212A RUPTURE OF LEFT DISTAL BICEPS TENDON, INITIAL ENCOUNTER: Primary | ICD-10-CM

## 2023-05-15 NOTE — TELEPHONE ENCOUNTER
Left VM for patient to return call to discuss earlier appointment. Message sent via [employer] as well.     Per Dr. Jose Antonio mckeon to double book. Please offer 8:40, 8:40, 10, 10:40 or 11 on Wednesday 5/17/23 in [place].     Radha Kaur MSA, ATC  Certified Athletic Trainer

## 2023-05-16 PROBLEM — S46.212A RUPTURE OF LEFT DISTAL BICEPS TENDON, INITIAL ENCOUNTER: Status: ACTIVE | Noted: 2023-05-16

## 2023-05-17 ENCOUNTER — TELEPHONE (OUTPATIENT)
Dept: ORTHOPEDICS | Facility: CLINIC | Age: 53
End: 2023-05-17

## 2023-05-17 ENCOUNTER — OFFICE VISIT (OUTPATIENT)
Dept: ORTHOPEDICS | Facility: CLINIC | Age: 53
End: 2023-05-17
Attending: FAMILY MEDICINE
Payer: OTHER GOVERNMENT

## 2023-05-17 ENCOUNTER — PRE VISIT (OUTPATIENT)
Dept: SURGERY | Facility: CLINIC | Age: 53
End: 2023-05-17

## 2023-05-17 VITALS
WEIGHT: 206 LBS | SYSTOLIC BLOOD PRESSURE: 125 MMHG | HEIGHT: 72 IN | BODY MASS INDEX: 27.9 KG/M2 | DIASTOLIC BLOOD PRESSURE: 75 MMHG

## 2023-05-17 DIAGNOSIS — S46.212D RUPTURE OF LEFT DISTAL BICEPS TENDON, SUBSEQUENT ENCOUNTER: Primary | ICD-10-CM

## 2023-05-17 DIAGNOSIS — S46.292D: ICD-10-CM

## 2023-05-17 DIAGNOSIS — S46.212A RUPTURE OF LEFT DISTAL BICEPS TENDON, INITIAL ENCOUNTER: Primary | ICD-10-CM

## 2023-05-17 PROCEDURE — 99204 OFFICE O/P NEW MOD 45 MIN: CPT | Performed by: ORTHOPAEDIC SURGERY

## 2023-05-17 NOTE — PATIENT INSTRUCTIONS
[facility] [address], [address]  [place], [place] [address] [place] [id number]   Appointments: [phone] Appointments: [phone]   Fax: [phone] Fax: [phone]       Schedule Surgery:   -To schedule bicep procedure, please contact my surgery scheduler at [phone].   Once surgery is scheduled you will need to schedule your pre-operative physical, to be completed within 30 days of the scheduled procedure.     Physical therapy orders will be placed, Physical     Forms:   If you are needing any forms completed relating to your upcoming procedure, please send them to our office with a completed Release of Information.   Forms will be completed AFTER your procedure. A letter can be sent to your employer prior to surgery to inform them of your anticipated time off.    Please notify our staff if you would like a letter to do so.   Forms can be faxed directly to our clinic at [phone].     DO NOT BRING FORMS ON THE DATE OF SURGERY.     Please contact my office with any questions, [phone].

## 2023-05-17 NOTE — TELEPHONE ENCOUNTER
Patient is scheduled for surgery with Dr. Brady    Spoke with: Joe    Date of Surgery: 5/24/23    Location: ASC    Informed patient they will need an adult  Yes    Pre op with Provider PAC    Additional imaging/appointments: POP Made    Surgery packet: Received     Additional comments: N/A        Lacy Cohn on 5/17/2023 at 12:52 PM

## 2023-05-17 NOTE — LETTER
5/17/2023         RE: Joe Mata  [address] [address] [facility] [address]        Dear Colleague,    Thank you for referring your patient, Joe Mata, to the [facility] [place]. Please see a copy of my visit note below.    CHIEF COMPLAINT: Left elbow pain     DIAGNOSIS: Left elbow distal biceps tendon tear    OCCUPATION/SPORT: In the , very active and participates in running, biking, weights     HPI:  Joe Mata is a very pleasant 52 year old, right-hand dominant male who presents for evaluation of left elbow pain. Symptoms started in April 14th of 2023. There was a precipitating event putting a tire on a truck and heard a pop . The pain is located to the left anterior medial bicep muscle belly. Worst pain is rated a 7 of 10, and current pain is rated at 0 of 10. Symptoms are worsened by elbow flexion and internal/external rotation. Symptoms are improved with compression wrap. Patient has tried compression with mild relief. Associated symptoms include sharp pain. Patient has radiate radiating down the arm, no  numbness. Notably, the patient has had MRI 05/09/23 XR 04/14/23 . No other concerns or complaints at this time.    PAST MEDICAL HISTORY:  No past medical history on file.    PAST SURGICAL HISTORY:  Past Surgical History:   Procedure Laterality Date     PAST SURGICAL HISTORY  1992    achilles tendon left     PAST SURGICAL HISTORY  1997    wisdom teeth       CURRENT MEDICATIONS:  No current outpatient medications on file.       ALLERGIES:    No Known Allergies      FAMILY HISTORY: No pertinent family history, reviewed in EMR.    SOCIAL HISTORY:   Social History     Socioeconomic History     Marital status:      Spouse name: Not on file     Number of children: Not on file     Years of education: Not on file     Highest education level: Not on file   Occupational History     Not on file   Tobacco Use     Smoking status: Former     Smokeless  "tobacco: Never     Tobacco comments:     2004/7   Vaping Use     Vaping status: Not on file   Substance and Sexual Activity     Alcohol use: Yes     Comment: occ     Drug use: No     Sexual activity: Not on file   Other Topics Concern      Service Yes     Comment: [employer]     Blood Transfusions Not Asked     Caffeine Concern Not Asked     Occupational Exposure Not Asked     Hobby Hazards Not Asked     Sleep Concern Not Asked     Stress Concern Not Asked     Weight Concern No     Special Diet Not Asked     Back Care Yes     Comment: recent problem     Exercise Not Asked     Bike Helmet Not Asked     Seat Belt Not Asked     Self-Exams Not Asked     Parent/sibling w/ CABG, MI or angioplasty before 65F 55M? Not Asked   Social History Narrative     Not on file     Social Determinants of Health     Financial Resource Strain: Not on file   Food Insecurity: Not on file   Transportation Needs: Not on file   Physical Activity: Not on file   Stress: Not on file   Social Connections: Not on file   Intimate Partner Violence: Not on file   Housing Stability: Not on file       REVIEW OF SYSTEMS: Positive for that noted in past medical history and history of present illness and otherwise reviewed in EMR    PHYSICAL EXAM:  Patient is 6' 0\" and weighs 206 lbs 0 oz. /75   Ht 1.829 m (6')   Wt 93.4 kg (206 lb)   BMI 27.94 kg/m    Constitutional: Well-developed, well-nourished, healthy appearing male.  Skin: Warm, dry   HEENT: Normal  Cardiac: Well perfused extremities, strong 2+ peripheral pulses. No edema.   Pulmonary: Breathing room air    Musculoskeletal:   Left Elbow:  Full passive motion 0-135, full supination and pronation  Abnormal hook exam  5-/5 elbow flexion, 4/5 supination  Neurovascular exam and cervical spine exam are normal.    IMAGING: I personally reviewed the x-rays and MRI which demonstrate tear of the distal biceps tendon insertion with no significant retraction     ASSESSMENT/SURGICAL " DIAGNOSIS: 52 year old-year-old right hand dominant male with left  Elbow distal biceps tendon tear      PLAN:  I had a nice discussion with Joe today.  We discussed the results of the x-ray and the MRI.  Discussed with the patient that there is evidence of a non retracted tear of the distal biceps insertion.  We discussed options including conservative versus surgical intervention.  Conservatively we discussed the option of trying physical therapy, we discussed the risk of this would be that the tendon tear does complete and results in a distal biceps rupture.  We discussed that his distal biceps tendon tear results in approximately 30% decreased in elbow flexion strength and 40% decrease in elbow supination strength.  Alternatively we discussed surgery in the form of a completion of tear with distal biceps repair.  I went through the risk and benefits of surgery as well as the anticipated rehab course.  Specific risks and benefits discussed included the risks of postoperative stiffness, lateral antebrachial cutaneous nerve injury, posterior interosseous nerve injury, biceps tendon rerupture retearing, hardware related complications, neurovascular injury.  Specifically I showed and discussed with the patient that the patient does have decreased range of motion preoperatively, the patient is lacking full flexion and supination, we discussed that this may only get worse with surgery.  Patient demonstrated understanding and does wish to proceed forward with surgery.     After going over these options, Bharat would like to proceed with left elbow distal biceps tendon repair and related procedures. We reviewed the risks and benefits of surgery including but not limited to the following: Risk of anesthesia, including death; infection; nerve/tendon/vessel injury; deep venous thrombosis (DVT), pulmonary embolism (PE); elbow stiffness, infection requiring implant removal, bleeding requiring transfusion, nerve and blood  vessel injury especially LABC and PIN, periprosthetic fracture, tendon retear, implant failure or loosening requiring revision surgery; hardware- related problems; potential for continued pain after surgery; and the potential need for further surgery in the future.   At the conclusion of the office visit, Joe verbally acknowledged that I answered all questions satisfactorily.        Again, thank you for allowing me to participate in the care of your patient.        Sincerely,        RICARDO PACE MD

## 2023-05-17 NOTE — PROGRESS NOTES
CHIEF COMPLAINT: Left elbow pain     DIAGNOSIS: Left elbow distal biceps tendon tear    OCCUPATION/SPORT: In the , very active and participates in running, biking, weights     HPI:  Joe Mata is a very pleasant 52 year old, right-hand dominant male who presents for evaluation of left elbow pain. Symptoms started in April 14th of 2023. There was a precipitating event putting a tire on a truck and heard a pop . The pain is located to the left anterior medial bicep muscle belly. Worst pain is rated a 7 of 10, and current pain is rated at 0 of 10. Symptoms are worsened by elbow flexion and internal/external rotation. Symptoms are improved with compression wrap. Patient has tried compression with mild relief. Associated symptoms include sharp pain. Patient has radiate radiating down the arm, no  numbness. Notably, the patient has had MRI 05/09/23 XR 04/14/23 . No other concerns or complaints at this time.    PAST MEDICAL HISTORY:  No past medical history on file.    PAST SURGICAL HISTORY:  Past Surgical History:   Procedure Laterality Date     PAST SURGICAL HISTORY  1992    achilles tendon left     PAST SURGICAL HISTORY  1997    wisdom teeth       CURRENT MEDICATIONS:  No current outpatient medications on file.       ALLERGIES:    No Known Allergies      FAMILY HISTORY: No pertinent family history, reviewed in EMR.    SOCIAL HISTORY:   Social History     Socioeconomic History     Marital status:      Spouse name: Not on file     Number of children: Not on file     Years of education: Not on file     Highest education level: Not on file   Occupational History     Not on file   Tobacco Use     Smoking status: Former     Smokeless tobacco: Never     Tobacco comments:     2004/7   Vaping Use     Vaping status: Not on file   Substance and Sexual Activity     Alcohol use: Yes     Comment: occ     Drug use: No     Sexual activity: Not on file   Other Topics Concern      Service Yes     Comment:  "[employer]     Blood Transfusions Not Asked     Caffeine Concern Not Asked     Occupational Exposure Not Asked     Hobby Hazards Not Asked     Sleep Concern Not Asked     Stress Concern Not Asked     Weight Concern No     Special Diet Not Asked     Back Care Yes     Comment: recent problem     Exercise Not Asked     Bike Helmet Not Asked     Seat Belt Not Asked     Self-Exams Not Asked     Parent/sibling w/ CABG, MI or angioplasty before 65F 55M? Not Asked   Social History Narrative     Not on file     Social Determinants of Health     Financial Resource Strain: Not on file   Food Insecurity: Not on file   Transportation Needs: Not on file   Physical Activity: Not on file   Stress: Not on file   Social Connections: Not on file   Intimate Partner Violence: Not on file   Housing Stability: Not on file       REVIEW OF SYSTEMS: Positive for that noted in past medical history and history of present illness and otherwise reviewed in EMR    PHYSICAL EXAM:  Patient is 6' 0\" and weighs 206 lbs 0 oz. /75   Ht 1.829 m (6')   Wt 93.4 kg (206 lb)   BMI 27.94 kg/m    Constitutional: Well-developed, well-nourished, healthy appearing male.  Skin: Warm, dry   HEENT: Normal  Cardiac: Well perfused extremities, strong 2+ peripheral pulses. No edema.   Pulmonary: Breathing room air    Musculoskeletal:   Left Elbow:  Full passive motion 0-135, full supination and pronation  Abnormal hook exam  5-/5 elbow flexion, 4/5 supination  Neurovascular exam and cervical spine exam are normal.    IMAGING: I personally reviewed the x-rays and MRI which demonstrate tear of the distal biceps tendon insertion with no significant retraction     ASSESSMENT/SURGICAL DIAGNOSIS: 52 year old-year-old right hand dominant male with left  Elbow distal biceps tendon tear      PLAN:  I had a nice discussion with Joe song.  We discussed the results of the x-ray and the MRI.  Discussed with the patient that there is evidence of a non retracted " tear of the distal biceps insertion.  We discussed options including conservative versus surgical intervention.  Conservatively we discussed the option of trying physical therapy, we discussed the risk of this would be that the tendon tear does complete and results in a distal biceps rupture.  We discussed that his distal biceps tendon tear results in approximately 30% decreased in elbow flexion strength and 40% decrease in elbow supination strength.  Alternatively we discussed surgery in the form of a completion of tear with distal biceps repair.  I went through the risk and benefits of surgery as well as the anticipated rehab course.  Specific risks and benefits discussed included the risks of postoperative stiffness, lateral antebrachial cutaneous nerve injury, posterior interosseous nerve injury, biceps tendon rerupture retearing, hardware related complications, neurovascular injury.  Specifically I showed and discussed with the patient that the patient does have decreased range of motion preoperatively, the patient is lacking full flexion and supination, we discussed that this may only get worse with surgery.  Patient demonstrated understanding and does wish to proceed forward with surgery.     After going over these options, Bharat would like to proceed with left elbow distal biceps tendon repair and related procedures. We reviewed the risks and benefits of surgery including but not limited to the following: Risk of anesthesia, including death; infection; nerve/tendon/vessel injury; deep venous thrombosis (DVT), pulmonary embolism (PE); elbow stiffness, infection requiring implant removal, bleeding requiring transfusion, nerve and blood vessel injury especially LABC and PIN, periprosthetic fracture, tendon retear, implant failure or loosening requiring revision surgery; hardware- related problems; potential for continued pain after surgery; and the potential need for further surgery in the future.   At the  conclusion of the office visit, Joe verbally acknowledged that I answered all questions satisfactorily.

## 2023-05-17 NOTE — TELEPHONE ENCOUNTER
Phoned patient to confirm surgery date with Dr. Brady.     Provided reason of call and call back number of [phone].

## 2023-05-17 NOTE — TELEPHONE ENCOUNTER
FUTURE VISIT INFORMATION      SURGERY INFORMATION:    Date: 5/24/23    [facility]    Surgeon:  Melissa Brady MD    Anesthesia Type:  General with block    Procedure: REPAIR, TENDON, LEFT DISTAL BICEPS    RECORDS REQUESTED FROM:       Primary Care Provider: Anh Hatfield MD

## 2023-05-19 ENCOUNTER — ANESTHESIA EVENT (OUTPATIENT)
Dept: SURGERY | Facility: AMBULATORY SURGERY CENTER | Age: 53
End: 2023-05-19
Payer: OTHER GOVERNMENT

## 2023-05-19 ENCOUNTER — VIRTUAL VISIT (OUTPATIENT)
Dept: SURGERY | Facility: CLINIC | Age: 53
End: 2023-05-19
Payer: OTHER GOVERNMENT

## 2023-05-19 ENCOUNTER — PRE VISIT (OUTPATIENT)
Dept: SURGERY | Facility: CLINIC | Age: 53
End: 2023-05-19

## 2023-05-19 DIAGNOSIS — Z01.818 PREOP EXAMINATION: Primary | ICD-10-CM

## 2023-05-19 DIAGNOSIS — S46.212A RUPTURE OF LEFT DISTAL BICEPS TENDON, INITIAL ENCOUNTER: ICD-10-CM

## 2023-05-19 PROCEDURE — 99203 OFFICE O/P NEW LOW 30 MIN: CPT | Mod: VID | Performed by: NURSE PRACTITIONER

## 2023-05-19 RX ORDER — QUINIDINE GLUCONATE 324 MG
1 TABLET, EXTENDED RELEASE ORAL EVERY MORNING
COMMUNITY

## 2023-05-19 ASSESSMENT — LIFESTYLE VARIABLES: TOBACCO_USE: 1

## 2023-05-19 ASSESSMENT — ENCOUNTER SYMPTOMS: ORTHOPNEA: 0

## 2023-05-19 NOTE — PROGRESS NOTES
Joe is a 52 year old who is being evaluated via a billable video visit.      How would you like to obtain your AVS? [email or web address]      Subjective   Joe is a 52 year old, presenting for the following health issues:  Pre-Op Exam (/)    HPI         Review of Systems       Physical Exam     DESIRAE Austin LPN

## 2023-05-19 NOTE — H&P
Pre-Operative H & P     CC:  Preoperative exam to assess for increased cardiopulmonary risk while undergoing surgery and anesthesia.    Date of Encounter: 5/19/2023  Primary Care Physician:  Anh Hatfield     Reason for visit:   Encounter Diagnoses   Name Primary?     Preop examination Yes     Rupture of left distal biceps tendon, initial encounter        HPI  Joe Mata is a 52 year old male who presents for pre-operative H & P in preparation for  Procedure Information     Case: [id number] Date/Time: 05/24/23 1025    Procedure: REPAIR, TENDON, LEFT DISTAL BICEPS (Left: Arm)    Anesthesia type: General with Block    Diagnosis: Rupture of left distal biceps tendon, initial encounter [S46.212A]    Pre-op diagnosis: Rupture of left distal biceps tendon, initial encounter [S46.212A]    Location: [place] [id number] / [facility]    Providers: Melissa Brady MD        Joe Mata is a 52 year old male with no chronic medical conditions that has a rupture of the left distal biceps tendon.  He recalls an event on 4/14/23 when he was lifting a tire and he heard a pop.  He continued to have left bicep and elbow pain since.  He managed with prn use of a sling and prn use of a compression sleeve.  He has more recently consulted with Dr. Brady for evaluation.  The above diagnosis was made and the above listed surgery has been recommended for treatment.     History is obtained from the patient and chart review    Hx of abnormal bleeding or anti-platelet use: none      Past Medical History  Past Medical History:   Diagnosis Date     History of smoking        Past Surgical History  Past Surgical History:   Procedure Laterality Date     EXTRACTION(S) DENTAL  2021     PAST SURGICAL HISTORY  01/01/1992    achilles tendon left     PAST SURGICAL HISTORY  01/01/1997    wisdom teeth       Prior to Admission Medications  Current Outpatient Medications   Medication Sig Dispense Refill      Ferrous Gluconate 240 (27 Fe) MG TABS Take 1 tablet by mouth every morning         Allergies  No Known Allergies    Social History  Social History     Socioeconomic History     Marital status:      Spouse name: Not on file     Number of children: 2     Years of education: Not on file     Highest education level: Not on file   Occupational History     Comment: [employer]   Tobacco Use     Smoking status: Former     Packs/day: 1.00     Years: 10.00     Pack years: 10.00     Types: Cigarettes     Quit date:      Years since quittin.3     Passive exposure: Past     Smokeless tobacco: Never     Tobacco comments:        Vaping Use     Vaping status: Not on file   Substance and Sexual Activity     Alcohol use: Not Currently     Drug use: No     Sexual activity: Not on file   Other Topics Concern      Service Yes     Comment: [employer]     Blood Transfusions Not Asked     Caffeine Concern Not Asked     Occupational Exposure Not Asked     Hobby Hazards Not Asked     Sleep Concern Not Asked     Stress Concern Not Asked     Weight Concern No     Special Diet Not Asked     Back Care Yes     Comment: recent problem     Exercise Not Asked     Bike Helmet Not Asked     Seat Belt Not Asked     Self-Exams Not Asked     Parent/sibling w/ CABG, MI or angioplasty before 65F 55M? Not Asked   Social History Narrative     Not on file     Social Determinants of Health     Financial Resource Strain: Not on file   Food Insecurity: Not on file   Transportation Needs: Not on file   Physical Activity: Not on file   Stress: Not on file   Social Connections: Not on file   Intimate Partner Violence: Not on file   Housing Stability: Not on file       Family History  Family History   Problem Relation Age of Onset     No Known Problems Mother      No Known Problems Father      Cerebrovascular Disease Paternal Grandfather      Allergies Son      Anesthesia Reaction No family hx of      Thrombosis No  family hx of        Review of Systems  The complete review of systems is negative other than noted in the HPI or here.   Anesthesia Evaluation   Pt has had prior anesthetic.     No history of anesthetic complications       ROS/MED HX  ENT/Pulmonary:     (+) tobacco use, Past use,     Neurologic:  - neg neurologic ROS     Cardiovascular:     (+) -----No previous cardiac testing  (-) orthopnea/PND   METS/Exercise Tolerance: >4 METS Comment: Is very active running at least 60-80.   Hematologic:  - neg hematologic  ROS     Musculoskeletal: Comment: Rupture of left distal biceps tendon      GI/Hepatic:       Renal/Genitourinary:  - neg Renal ROS     Endo:  - neg endo ROS     Psychiatric/Substance Use:  - neg psychiatric ROS     Infectious Disease:  - neg infectious disease ROS     Malignancy:  - neg malignancy ROS     Other:  - neg other ROS          Virtual visit -  No vitals were obtained    Physical Exam  Constitutional: Awake, alert, cooperative, no apparent distress, and appears stated age.  Eyes: Pupils equal  HENT: Normocephalic  Respiratory: non labored breathing   Neurologic: Awake, alert, oriented to name, place and time.   Neuropsychiatric: Calm, cooperative. Normal affect.      Prior Labs/Diagnostic Studies   All labs and imaging personally reviewed     EKG/ stress test - none    Labs:    Component      Latest Ref Rng 5/22/2023  2:41 PM   Sodium      136 - 145 mmol/L 140    Potassium      3.4 - 5.3 mmol/L 4.4    Chloride      98 - 107 mmol/L 103    Carbon Dioxide (CO2)      22 - 29 mmol/L 27    Anion Gap      7 - 15 mmol/L 10    Urea Nitrogen      6.0 - 20.0 mg/dL 26.0 (H)    Creatinine      0.67 - 1.17 mg/dL 1.29 (H)    Calcium      8.6 - 10.0 mg/dL 10.2 (H)    Glucose      70 - 99 mg/dL 89    GFR Estimate      >60 mL/min/1.73m2 67    WBC      4.0 - 11.0 10e3/uL 6.4    RBC Count      4.40 - 5.90 10e6/uL 4.72    Hemoglobin      13.3 - 17.7 g/dL 14.9    Hematocrit      40.0 - 53.0 % 43.5    MCV      78 - 100  fL 92    MCH      26.5 - 33.0 pg 31.6    MCHC      31.5 - 36.5 g/dL 34.3    RDW      10.0 - 15.0 % 12.1    Platelet Count      150 - 450 10e3/uL 190       Legend:  (H) High      The patient's records and results personally reviewed by this provider.     Outside records reviewed from: No outside records available      Assessment      Joe Mata is a 52 year old male seen as a PAC referral for risk assessment and optimization for anesthesia.    Plan/Recommendations  Pt will be optimized for the proposed procedure.  See below for details on the assessment, risk, and preoperative recommendations    NEUROLOGY  - No history of TIA, CVA or seizure    -Post Op delirium risk factors:  No risk identified    ENT  - No current airway concerns.  Will need to be reassessed day of surgery.  Mallampati: Unable to assess  TM: Unable to assess    CARDIAC  - No history of CAD, Hypertension and Afib  - METS (Metabolic Equivalents)  Patient performs 4 or more METS exercise without symptoms            Total Score: 0      RCRI-Very low risk: Class 1 0.4% complication rate            Total Score: 0        PULMONARY    JENNIFER Low Risk            Total Score: 2    JENNIFER: Over 50 ys old    JENNIFER: Male      - Denies asthma or inhaler use  - Tobacco History      History   Smoking Status     Former     Packs/day: 1.00     Years: 10.00     Types: Cigarettes     Quit date: 2005   Smokeless Tobacco     Never       GI  - deneis GERD  PONV Medium Risk  Total Score: 2           1 AN PONV: Patient is not a current smoker    1 AN PONV: Intended Post Op Opioids            ENDOCRINE    - BMI: Estimated body mass index is 27.94 kg/m  as calculated from the following:    Height as of 5/17/23: 1.829 m (6').    Weight as of 5/17/23: 93.4 kg (206 lb).  Overweight (BMI 25.0-29.9)  - No history of Diabetes Mellitus    HEME  VTE Low Risk 0.5%            Total Score: 2    VTE: Male      - No history of abnormal bleeding or antiplatelet use.      MSK  - rupture of  the left distal biceps tendon.  Surgery planned as above.          Different anesthesia methods/types have been discussed with the patient, but they are aware that the final plan will be decided by the assigned anesthesia provider on the date of service.      The patient is optimized for their procedure. AVS with information on surgery time/arrival time, meds and NPO status given by nursing staff. No further diagnostic testing indicated.    Please refer to the physical examination documented by the anesthesiologist in the anesthesia record on the day of surgery.    Video-Visit Details    Type of service:  Video Visit    Provider received verbal consent for a Video Visit from the patient? Yes     Originating Location (pt. Location): Home    Distant Location (provider location):  Off-site  Mode of Communication:  Video Conference via PPLCONNECT  On the day of service:     Prep time: 5 minutes  Visit time:  14 minutes  Documentation time: 10 minutes  ------------------------------------------  Total time: 29 minutes      CHARLEY Kruse CNP  Preoperative Assessment Center  [facility]  Cl[facility] and [facility] Center  Phone: [phone]  Fax: [phone]

## 2023-05-19 NOTE — PATIENT INSTRUCTIONS
Preparing for Your Surgery      Name:  Joe Mata   MRN:  [id number]   :  1970   Today's Date:  2023         Arriving for surgery:  Surgery date:  23  Arrival time:  8:55AM    Restrictions due to COVID 19:    Effective 2022:  2 visitors may accompany patient and wait in the Surgery Waiting Room.  Please maintain social distance.  Masking is optional      parking is available for anyone with mobility limitations or disabilities. (Monday- Friday 7 am- 5 pm)    Please come to:    [facility]  [address] [place] [phone]    Please check in on the 5th floor at the Ambulatory Surgery Center.      What can I eat or drink?    -  You may eat and drink normally until 8 hours prior to arrival  time. (Until 23, 12:55AM)  -  You may have clear liquids until 2 hours prior to arrival  time. (Until 23, 6:55AM)    Examples of clear liquids:  Water  Clear broth  Juices (apple, white grape, white cranberry  and cider) without pulp  Noncarbonated, powder based beverages  (lemonade and Abdirahman-Aid)  Sodas (Sprite, 7-Up, ginger ale and seltzer)  Coffee or tea (without milk or cream)  Gatorade    --No alcohol for at least 24 hours before surgery.    Which medicines can I take?    Hold Aspirin for 7 days before surgery.   Hold Multivitamins for 7 days before surgery.  Hold Supplements for 7 days before surgery.  Hold Ibuprofen (Advil, Motrin) for 1 day before surgery--unless otherwise directed by surgeon.  Hold Naproxen (Aleve) for 4 days before surgery.      -  DO NOT take the following medications the day of surgery:    Ferrous Gluconate        How do I prepare myself?  - Please take 2 showers (one the night prior to surgery and one the morning of surgery) using Scrubcare or Hibiclens soap.    Use this soap only from the neck to your toes.     Leave the soap on your skin for one minute--then rinse thoroughly.      You may use your own shampoo and  conditioner. No other hair products.   - Please remove all jewelry and body piercings.  - No lotions, deodorants or fragrance.  - Bring your ID and insurance card.    -If you have a Deep Brain Stimulator, a Spinal Cord Stimulator, or any implanted Neuro Device, you must bring the remote to the Surgery Center.         ALL PATIENTS ARE REQUIRED TO HAVE A RESPONSIBLE ADULT TO DRIVE AND BE IN ATTENDANCE WITH THEM FOR 24 HOURS FOLLOWING SURGERY.     Covid testing policy as of 12/06/2022  Your surgeon will notify and schedule you for a COVID test if one is needed before surgery--please direct any questions or COVID symptoms to your surgeon      Questions or Concerns:    -For questions regarding the day of surgery, please contact the Ambulatory Surgery Center at [phone].    -If you have health changes between today and your surgery, please contact your surgeon.     - For questions after surgery, please contact your surgeon's office.

## 2023-05-22 ENCOUNTER — LAB (OUTPATIENT)
Dept: LAB | Facility: CLINIC | Age: 53
End: 2023-05-22
Payer: OTHER GOVERNMENT

## 2023-05-22 DIAGNOSIS — Z01.818 PREOP EXAMINATION: ICD-10-CM

## 2023-05-22 DIAGNOSIS — S46.212A RUPTURE OF LEFT DISTAL BICEPS TENDON, INITIAL ENCOUNTER: ICD-10-CM

## 2023-05-22 LAB
ANION GAP SERPL CALCULATED.3IONS-SCNC: 10 MMOL/L (ref 7–15)
BUN SERPL-MCNC: 26 MG/DL (ref 6–20)
CALCIUM SERPL-MCNC: 10.2 MG/DL (ref 8.6–10)
CHLORIDE SERPL-SCNC: 103 MMOL/L (ref 98–107)
CREAT SERPL-MCNC: 1.29 MG/DL (ref 0.67–1.17)
DEPRECATED HCO3 PLAS-SCNC: 27 MMOL/L (ref 22–29)
ERYTHROCYTE [DISTWIDTH] IN BLOOD BY AUTOMATED COUNT: 12.1 % (ref 10–15)
GFR SERPL CREATININE-BSD FRML MDRD: 67 ML/MIN/1.73M2
GLUCOSE SERPL-MCNC: 89 MG/DL (ref 70–99)
HCT VFR BLD AUTO: 43.5 % (ref 40–53)
HGB BLD-MCNC: 14.9 G/DL (ref 13.3–17.7)
MCH RBC QN AUTO: 31.6 PG (ref 26.5–33)
MCHC RBC AUTO-ENTMCNC: 34.3 G/DL (ref 31.5–36.5)
MCV RBC AUTO: 92 FL (ref 78–100)
PLATELET # BLD AUTO: 190 10E3/UL (ref 150–450)
POTASSIUM SERPL-SCNC: 4.4 MMOL/L (ref 3.4–5.3)
RBC # BLD AUTO: 4.72 10E6/UL (ref 4.4–5.9)
SODIUM SERPL-SCNC: 140 MMOL/L (ref 136–145)
WBC # BLD AUTO: 6.4 10E3/UL (ref 4–11)

## 2023-05-22 PROCEDURE — 80048 BASIC METABOLIC PNL TOTAL CA: CPT

## 2023-05-22 PROCEDURE — 85027 COMPLETE CBC AUTOMATED: CPT

## 2023-05-22 PROCEDURE — 36415 COLL VENOUS BLD VENIPUNCTURE: CPT

## 2023-05-23 NOTE — RESULT ENCOUNTER NOTE
Cristina Diaz,    Your test results are attached.  Your labs are okay for surgery.  Your creatinine level (kidney function test) is a bit elevated.  I don't have any prior labs to compare this to.  I would recommend that you follow up with your primary care provider when you are able after surgery for this to be rechecked and monitored.         Yesenia Sandra DNP, RN, ANP-C

## 2023-05-24 ENCOUNTER — ANESTHESIA (OUTPATIENT)
Dept: SURGERY | Facility: AMBULATORY SURGERY CENTER | Age: 53
End: 2023-05-24
Payer: OTHER GOVERNMENT

## 2023-05-24 ENCOUNTER — ANCILLARY PROCEDURE (OUTPATIENT)
Dept: RADIOLOGY | Facility: AMBULATORY SURGERY CENTER | Age: 53
End: 2023-05-24
Attending: ORTHOPAEDIC SURGERY
Payer: OTHER GOVERNMENT

## 2023-05-24 ENCOUNTER — HOSPITAL ENCOUNTER (OUTPATIENT)
Facility: AMBULATORY SURGERY CENTER | Age: 53
Discharge: HOME | End: 2023-05-24
Attending: ORTHOPAEDIC SURGERY
Payer: OTHER GOVERNMENT

## 2023-05-24 VITALS
SYSTOLIC BLOOD PRESSURE: 106 MMHG | BODY MASS INDEX: 27.9 KG/M2 | TEMPERATURE: 97.1 F | HEIGHT: 72 IN | HEART RATE: 43 BPM | RESPIRATION RATE: 12 BRPM | DIASTOLIC BLOOD PRESSURE: 65 MMHG | OXYGEN SATURATION: 97 % | WEIGHT: 206 LBS

## 2023-05-24 DIAGNOSIS — R52 PAIN: ICD-10-CM

## 2023-05-24 DIAGNOSIS — S46.212A RUPTURE OF LEFT DISTAL BICEPS TENDON, INITIAL ENCOUNTER: Primary | ICD-10-CM

## 2023-05-24 PROCEDURE — 24342 REPAIR OF RUPTURED TENDON: CPT | Mod: LT

## 2023-05-24 PROCEDURE — 24342 REPAIR OF RUPTURED TENDON: CPT | Mod: LT | Performed by: ORTHOPAEDIC SURGERY

## 2023-05-24 PROCEDURE — C9290 INJ, BUPIVACAINE LIPOSOME: HCPCS

## 2023-05-24 DEVICE — LARGE PEC BUTTON
Type: IMPLANTABLE DEVICE | Site: ARM | Status: FUNCTIONAL
Brand: ARTHREX®

## 2023-05-24 RX ORDER — NALOXONE HYDROCHLORIDE 0.4 MG/ML
0.2 INJECTION, SOLUTION INTRAMUSCULAR; INTRAVENOUS; SUBCUTANEOUS
Status: DISCONTINUED | OUTPATIENT
Start: 2023-05-24 | End: 2023-05-24 | Stop reason: HOSPADM

## 2023-05-24 RX ORDER — HYDROMORPHONE HYDROCHLORIDE 1 MG/ML
0.2 INJECTION, SOLUTION INTRAMUSCULAR; INTRAVENOUS; SUBCUTANEOUS EVERY 5 MIN PRN
Status: DISCONTINUED | OUTPATIENT
Start: 2023-05-24 | End: 2023-05-25 | Stop reason: HOSPADM

## 2023-05-24 RX ORDER — CEFAZOLIN SODIUM 2 G/50ML
2 SOLUTION INTRAVENOUS SEE ADMIN INSTRUCTIONS
Status: CANCELLED | OUTPATIENT
Start: 2023-05-24

## 2023-05-24 RX ORDER — CEFAZOLIN SODIUM 2 G/50ML
2 SOLUTION INTRAVENOUS
Status: CANCELLED | OUTPATIENT
Start: 2023-05-24

## 2023-05-24 RX ORDER — FENTANYL CITRATE 50 UG/ML
25 INJECTION, SOLUTION INTRAMUSCULAR; INTRAVENOUS EVERY 5 MIN PRN
Status: DISCONTINUED | OUTPATIENT
Start: 2023-05-24 | End: 2023-05-25 | Stop reason: HOSPADM

## 2023-05-24 RX ORDER — SODIUM CHLORIDE, SODIUM LACTATE, POTASSIUM CHLORIDE, CALCIUM CHLORIDE 600; 310; 30; 20 MG/100ML; MG/100ML; MG/100ML; MG/100ML
INJECTION, SOLUTION INTRAVENOUS CONTINUOUS
Status: DISCONTINUED | OUTPATIENT
Start: 2023-05-24 | End: 2023-05-24 | Stop reason: HOSPADM

## 2023-05-24 RX ORDER — FENTANYL CITRATE 50 UG/ML
50 INJECTION, SOLUTION INTRAMUSCULAR; INTRAVENOUS EVERY 5 MIN PRN
Status: DISCONTINUED | OUTPATIENT
Start: 2023-05-24 | End: 2023-05-25 | Stop reason: HOSPADM

## 2023-05-24 RX ORDER — ACETAMINOPHEN 325 MG/1
975 TABLET ORAL ONCE
Status: COMPLETED | OUTPATIENT
Start: 2023-05-24 | End: 2023-05-24

## 2023-05-24 RX ORDER — OXYCODONE HYDROCHLORIDE 5 MG/1
10 TABLET ORAL
Status: DISCONTINUED | OUTPATIENT
Start: 2023-05-24 | End: 2023-05-25 | Stop reason: HOSPADM

## 2023-05-24 RX ORDER — MULTIVIT WITH MINERALS/LUTEIN
1 TABLET ORAL DAILY
COMMUNITY

## 2023-05-24 RX ORDER — LIDOCAINE HYDROCHLORIDE 20 MG/ML
INJECTION, SOLUTION INFILTRATION; PERINEURAL PRN
Status: DISCONTINUED | OUTPATIENT
Start: 2023-05-24 | End: 2023-05-24

## 2023-05-24 RX ORDER — ONDANSETRON 2 MG/ML
4 INJECTION INTRAMUSCULAR; INTRAVENOUS EVERY 30 MIN PRN
Status: DISCONTINUED | OUTPATIENT
Start: 2023-05-24 | End: 2023-05-25 | Stop reason: HOSPADM

## 2023-05-24 RX ORDER — DEXAMETHASONE SODIUM PHOSPHATE 4 MG/ML
INJECTION, SOLUTION INTRA-ARTICULAR; INTRALESIONAL; INTRAMUSCULAR; INTRAVENOUS; SOFT TISSUE PRN
Status: DISCONTINUED | OUTPATIENT
Start: 2023-05-24 | End: 2023-05-24

## 2023-05-24 RX ORDER — NALOXONE HYDROCHLORIDE 0.4 MG/ML
0.4 INJECTION, SOLUTION INTRAMUSCULAR; INTRAVENOUS; SUBCUTANEOUS
Status: DISCONTINUED | OUTPATIENT
Start: 2023-05-24 | End: 2023-05-24 | Stop reason: HOSPADM

## 2023-05-24 RX ORDER — ONDANSETRON 4 MG/1
4 TABLET, ORALLY DISINTEGRATING ORAL EVERY 8 HOURS PRN
Qty: 10 TABLET | Refills: 0 | Status: SHIPPED | OUTPATIENT
Start: 2023-05-24

## 2023-05-24 RX ORDER — EPHEDRINE SULFATE 50 MG/ML
INJECTION, SOLUTION INTRAMUSCULAR; INTRAVENOUS; SUBCUTANEOUS PRN
Status: DISCONTINUED | OUTPATIENT
Start: 2023-05-24 | End: 2023-05-24

## 2023-05-24 RX ORDER — SODIUM CHLORIDE, SODIUM LACTATE, POTASSIUM CHLORIDE, CALCIUM CHLORIDE 600; 310; 30; 20 MG/100ML; MG/100ML; MG/100ML; MG/100ML
INJECTION, SOLUTION INTRAVENOUS CONTINUOUS
Status: DISCONTINUED | OUTPATIENT
Start: 2023-05-24 | End: 2023-05-25 | Stop reason: HOSPADM

## 2023-05-24 RX ORDER — HYDROMORPHONE HYDROCHLORIDE 1 MG/ML
0.4 INJECTION, SOLUTION INTRAMUSCULAR; INTRAVENOUS; SUBCUTANEOUS EVERY 5 MIN PRN
Status: DISCONTINUED | OUTPATIENT
Start: 2023-05-24 | End: 2023-05-25 | Stop reason: HOSPADM

## 2023-05-24 RX ORDER — CEFAZOLIN SODIUM 2 G/50ML
2 SOLUTION INTRAVENOUS SEE ADMIN INSTRUCTIONS
Status: DISCONTINUED | OUTPATIENT
Start: 2023-05-24 | End: 2023-05-24 | Stop reason: HOSPADM

## 2023-05-24 RX ORDER — GABAPENTIN 100 MG/1
100 CAPSULE ORAL 3 TIMES DAILY
Qty: 21 CAPSULE | Refills: 0 | Status: SHIPPED | OUTPATIENT
Start: 2023-05-24

## 2023-05-24 RX ORDER — DOCUSATE SODIUM 100 MG/1
100 CAPSULE, LIQUID FILLED ORAL 2 TIMES DAILY
Qty: 30 CAPSULE | Refills: 0 | Status: SHIPPED | OUTPATIENT
Start: 2023-05-24

## 2023-05-24 RX ORDER — PROPOFOL 10 MG/ML
INJECTION, EMULSION INTRAVENOUS PRN
Status: DISCONTINUED | OUTPATIENT
Start: 2023-05-24 | End: 2023-05-24

## 2023-05-24 RX ORDER — OXYCODONE HYDROCHLORIDE 5 MG/1
5 TABLET ORAL
Status: COMPLETED | OUTPATIENT
Start: 2023-05-24 | End: 2023-05-24

## 2023-05-24 RX ORDER — HYDROCODONE BITARTRATE AND ACETAMINOPHEN 5; 325 MG/1; MG/1
1 TABLET ORAL EVERY 6 HOURS PRN
Qty: 10 TABLET | Refills: 0 | Status: SHIPPED | OUTPATIENT
Start: 2023-05-24 | End: 2023-05-27

## 2023-05-24 RX ORDER — PROPOFOL 10 MG/ML
INJECTION, EMULSION INTRAVENOUS CONTINUOUS PRN
Status: DISCONTINUED | OUTPATIENT
Start: 2023-05-24 | End: 2023-05-24

## 2023-05-24 RX ORDER — FLUMAZENIL 0.1 MG/ML
0.2 INJECTION, SOLUTION INTRAVENOUS
Status: DISCONTINUED | OUTPATIENT
Start: 2023-05-24 | End: 2023-05-24 | Stop reason: HOSPADM

## 2023-05-24 RX ORDER — ONDANSETRON 4 MG/1
4 TABLET, ORALLY DISINTEGRATING ORAL EVERY 30 MIN PRN
Status: DISCONTINUED | OUTPATIENT
Start: 2023-05-24 | End: 2023-05-25 | Stop reason: HOSPADM

## 2023-05-24 RX ORDER — LIDOCAINE 40 MG/G
CREAM TOPICAL
Status: DISCONTINUED | OUTPATIENT
Start: 2023-05-24 | End: 2023-05-24 | Stop reason: HOSPADM

## 2023-05-24 RX ORDER — BUPIVACAINE HYDROCHLORIDE 2.5 MG/ML
INJECTION, SOLUTION EPIDURAL; INFILTRATION; INTRACAUDAL
Status: COMPLETED | OUTPATIENT
Start: 2023-05-24 | End: 2023-05-24

## 2023-05-24 RX ORDER — CELECOXIB 100 MG/1
100 CAPSULE ORAL 2 TIMES DAILY
Qty: 30 CAPSULE | Refills: 0 | Status: SHIPPED | OUTPATIENT
Start: 2023-05-24

## 2023-05-24 RX ORDER — FENTANYL CITRATE 50 UG/ML
25-50 INJECTION, SOLUTION INTRAMUSCULAR; INTRAVENOUS
Status: DISCONTINUED | OUTPATIENT
Start: 2023-05-24 | End: 2023-05-24 | Stop reason: HOSPADM

## 2023-05-24 RX ORDER — CEFAZOLIN SODIUM 2 G/50ML
2 SOLUTION INTRAVENOUS
Status: COMPLETED | OUTPATIENT
Start: 2023-05-24 | End: 2023-05-24

## 2023-05-24 RX ADMIN — HYDROMORPHONE HYDROCHLORIDE 0.2 MG: 1 INJECTION, SOLUTION INTRAMUSCULAR; INTRAVENOUS; SUBCUTANEOUS at 12:58

## 2023-05-24 RX ADMIN — FENTANYL CITRATE 25 MCG: 50 INJECTION, SOLUTION INTRAMUSCULAR; INTRAVENOUS at 12:41

## 2023-05-24 RX ADMIN — DEXAMETHASONE SODIUM PHOSPHATE 4 MG: 4 INJECTION, SOLUTION INTRA-ARTICULAR; INTRALESIONAL; INTRAMUSCULAR; INTRAVENOUS; SOFT TISSUE at 10:39

## 2023-05-24 RX ADMIN — PROPOFOL 200 MG: 10 INJECTION, EMULSION INTRAVENOUS at 10:39

## 2023-05-24 RX ADMIN — FENTANYL CITRATE 25 MCG: 50 INJECTION, SOLUTION INTRAMUSCULAR; INTRAVENOUS at 12:48

## 2023-05-24 RX ADMIN — Medication 0.5 MG: at 11:00

## 2023-05-24 RX ADMIN — SODIUM CHLORIDE, SODIUM LACTATE, POTASSIUM CHLORIDE, CALCIUM CHLORIDE: 600; 310; 30; 20 INJECTION, SOLUTION INTRAVENOUS at 09:52

## 2023-05-24 RX ADMIN — ACETAMINOPHEN 975 MG: 325 TABLET ORAL at 09:41

## 2023-05-24 RX ADMIN — CEFAZOLIN SODIUM 2 G: 2 SOLUTION INTRAVENOUS at 10:27

## 2023-05-24 RX ADMIN — PROPOFOL 150 MCG/KG/MIN: 10 INJECTION, EMULSION INTRAVENOUS at 10:39

## 2023-05-24 RX ADMIN — FENTANYL CITRATE 50 MCG: 50 INJECTION, SOLUTION INTRAMUSCULAR; INTRAVENOUS at 10:11

## 2023-05-24 RX ADMIN — LIDOCAINE HYDROCHLORIDE 100 MG: 20 INJECTION, SOLUTION INFILTRATION; PERINEURAL at 10:39

## 2023-05-24 RX ADMIN — EPHEDRINE SULFATE 10 MG: 50 INJECTION, SOLUTION INTRAMUSCULAR; INTRAVENOUS; SUBCUTANEOUS at 10:57

## 2023-05-24 RX ADMIN — FENTANYL CITRATE 25 MCG: 50 INJECTION, SOLUTION INTRAMUSCULAR; INTRAVENOUS at 12:52

## 2023-05-24 RX ADMIN — Medication 0.5 MG: at 12:29

## 2023-05-24 RX ADMIN — BUPIVACAINE HYDROCHLORIDE 20 ML: 2.5 INJECTION, SOLUTION EPIDURAL; INFILTRATION; INTRACAUDAL at 10:29

## 2023-05-24 RX ADMIN — OXYCODONE HYDROCHLORIDE 5 MG: 5 TABLET ORAL at 12:59

## 2023-05-24 RX ADMIN — FENTANYL CITRATE 25 MCG: 50 INJECTION, SOLUTION INTRAMUSCULAR; INTRAVENOUS at 12:36

## 2023-05-24 NOTE — OR NURSING
Patient received left side Supraclavicular nerve block  with Exparel.  Fentanyl 50mcg and Versed 1mg given. Tolerated procedure well.     Mónica Phan RN

## 2023-05-24 NOTE — ANESTHESIA PROCEDURE NOTES
"Brachial plexus Procedure Note    Pre-Procedure   Staff -        Anesthesiologist:  Enrico Bryant MD       Resident/Fellow: Han Pacheco MD       Performed By: resident       Location: pre-op       Pre-Anesthestic Checklist: patient identified, IV checked, site marked, risks and benefits discussed, informed consent, monitors and equipment checked, pre-op evaluation, at physician/surgeon's request and post-op pain management  Timeout:       Correct Patient: Yes        Correct Procedure: Yes        Correct Site: Yes        Correct Position: Yes        Correct Laterality: Yes        Site Marked: Yes  Procedure Documentation  Procedure: Brachial plexus       Laterality: left       Patient Position: sitting       Skin prep: Chloraprep (supraclavicular approach).       Needle Type: Syncapsey needle       Needle Gauge: 21.        Needle Length (Inches): 3.13        Ultrasound guided       1. Ultrasound was used to identify targeted nerve, plexus, vascular marker, or fascial plane and place a needle adjacent to it in real-time.       2. Ultrasound was used to visualize the spread of anesthetic in close proximity to the above referenced structure.    Assessment/Narrative         The placement was negative for: blood aspirated, painful injection and site bleeding       Paresthesias: No.       Bolus given via needle. no blood aspirated via catheter.        Secured via.        Insertion/Infusion Method: Single Shot       Complications: none    Medication(s) Administered   Bupivacaine 0.25% PF (Infiltration) - Infiltration   20 mL - 5/24/2023 10:29:00 AM  Bupivacaine liposome (Exparel) 1.3% LA inj susp (Infiltration) - Infiltration   10 mL - 5/24/2023 10:29:00 AM    FOR [facility] ([facility] ONLY:   Pain Team Contact information: please page the Pain Team Via ApptheGame. Search \"Pain\". During daytime hours, please page the attending first. At night please page the resident first.      "

## 2023-05-24 NOTE — ANESTHESIA PROCEDURE NOTES
Airway    Staff -        Anesthesiologist:  Enrico Bryant MD       Resident/Fellow: Han Pacheco MD       Performed By: residentIndications and Patient Condition       Indications for airway management: sil-procedural         Mask difficulty assessment: 0 - not attempted    Final Airway Details       Final airway type: supraglottic airway    Supraglottic Airway Details        Type: LMA       Brand: ProSeal       LMA size: 5    Post intubation assessment        Placement verified by: capnometry and equal breath sounds        Number of attempts at approach: 1       Number of other approaches attempted: 0       Secured with: pink tape       Ease of procedure: easy       Dentition: Intact

## 2023-05-24 NOTE — ANESTHESIA CARE TRANSFER NOTE
Patient: Joe Mata    Procedure: Procedure(s):  REPAIR, TENDON, LEFT DISTAL BICEPS       Diagnosis: Rupture of left distal biceps tendon, initial encounter [S46.212A]  Diagnosis Additional Information: No value filed.    Anesthesia Type:   General     Note:    Oropharynx: oropharynx clear of all foreign objects and spontaneously breathing  Level of Consciousness: awake  Oxygen Supplementation: nasal cannula  Level of Supplemental Oxygen (L/min / FiO2): 2    Dentition: dentition unchanged    Report to RN Given: handoff report given  Patient transferred to: PACU  Comments: Uneventful transport - NC O@ 2 lpm  Report to RN  Exchanging well; color natl  Pt responds appropriately to command  IV patent  Lips/teeth/dentition as preop status  Questions answered    Handoff Report: Identifed the Patient, Identified the Reponsible Provider, Reviewed the pertinent medical history, Discussed the surgical course, Reviewed Intra-OP anesthesia mangement and issues during anesthesia, Set expectations for post-procedure period and Allowed opportunity for questions and acknowledgement of understanding      Vitals:  Vitals Value Taken Time   BP 93/53 05/24/23 1229   Temp 97.1    Pulse 39 (SB) pt stated his resting HR is upper 30's - runs 80 miles per week 05/24/23 1232   Resp 12 05/24/23 1232   SpO2 100 % 05/24/23 1232   Vitals shown include unvalidated device data.    Electronically Signed By: CHARLEY CARLISLE CRNA  May 24, 2023  12:32 PM

## 2023-05-24 NOTE — ANESTHESIA PREPROCEDURE EVALUATION
Anesthesia Pre-Procedure Evaluation    Patient: Joe Mata   MRN: [id number] : 1970        Procedure : Procedure(s):  REPAIR, TENDON, LEFT DISTAL BICEPS          Past Medical History:   Diagnosis Date     History of smoking       Past Surgical History:   Procedure Laterality Date     EXTRACTION(S) DENTAL       PAST SURGICAL HISTORY  1992    achilles tendon left     PAST SURGICAL HISTORY  1997    wisdom teeth      No Known Allergies   Social History     Tobacco Use     Smoking status: Former     Packs/day: 1.00     Years: 10.00     Pack years: 10.00     Types: Cigarettes     Quit date:      Years since quittin.4     Passive exposure: Past     Smokeless tobacco: Never     Tobacco comments:        Vaping Use     Vaping status: Not on file   Substance Use Topics     Alcohol use: Not Currently      Wt Readings from Last 1 Encounters:   23 93.4 kg (206 lb)           Physical Exam    Airway        Mallampati: II   TM distance: > 3 FB      Respiratory Devices and Support         Dental           Cardiovascular          Rhythm and rate: regular     Pulmonary           breath sounds clear to auscultation           OUTSIDE LABS:  CBC:   Lab Results   Component Value Date    WBC 6.4 2023    HGB 14.9 2023    HCT 43.5 2023     2023     BMP:   Lab Results   Component Value Date     2023    POTASSIUM 4.4 2023    CHLORIDE 103 2023    CO2 27 2023    BUN 26.0 (H) 2023    CR 1.29 (H) 2023    GLC 89 2023     COAGS: No results found for: PTT, INR, FIBR  POC: No results found for: BGM, HCG, HCGS  HEPATIC: No results found for: ALBUMIN, PROTTOTAL, ALT, AST, GGT, ALKPHOS, BILITOTAL, BILIDIRECT, DAMARI  OTHER:   Lab Results   Component Value Date    SHI 10.2 (H) 2023       Anesthesia Plan    ASA Status:  1   NPO Status:  NPO Appropriate    Anesthesia Type: General.     - Airway: LMA   Induction: Intravenous.    Maintenance: TIVA.        Consents    Anesthesia Plan(s) and associated risks, benefits, and realistic alternatives discussed. Questions answered and patient/representative(s) expressed understanding.    - Discussed:     - Discussed with:  Patient         Postoperative Care    Pain management: Oral pain medications, Multi-modal analgesia, Peripheral nerve block (Single Shot), IV analgesics.   PONV prophylaxis: Ondansetron (or other 5HT-3), Dexamethasone or Solumedrol     Comments:                Enrico Bryant MD

## 2023-05-24 NOTE — ANESTHESIA POSTPROCEDURE EVALUATION
Patient: Joe Mata    Procedure: Procedure(s):  REPAIR, TENDON, LEFT DISTAL BICEPS       Anesthesia Type:  General    Note:  Disposition: Outpatient   Postop Pain Control: Uneventful            Sign Out: Well controlled pain   PONV: No   Neuro/Psych: Uneventful            Sign Out: Acceptable/Baseline neuro status   Airway/Respiratory: Uneventful            Sign Out: Acceptable/Baseline resp. status   CV/Hemodynamics: Uneventful            Sign Out: Acceptable CV status; No obvious hypovolemia; No obvious fluid overload   Other NRE: NONE   DID A NON-ROUTINE EVENT OCCUR?            Last vitals:  Vitals Value Taken Time   /70 05/24/23 1300   Temp 36.2  C (97.1  F) 05/24/23 1300   Pulse 44 05/24/23 1305   Resp 7 05/24/23 1305   SpO2 96 % 05/24/23 1305   Vitals shown include unvalidated device data.    Electronically Signed By: Enrico Bryant MD  May 24, 2023  1:14 PM

## 2023-05-24 NOTE — DISCHARGE INSTRUCTIONS
DISTAL BICEPS REPAIR    POST OP INSTRUCTIONS  Melissa Brady MD  [phone]   of Orthopedic Surgery  [facility], [facility]      ABOUT YOUR SURGERY  With this surgery, we took your torn biceps tendon and repaired it to its attachment site of a bone (radius) in your forearm. With time, the tendon will heal back to the bone and function to again flex and supinate your forearm.    POST OP OFFICE VISIT  You should have an office visit scheduled with Dr. Brady or her Physician Assistant, Saud Rodriguez, within 7-10 days after your surgery. If you do not have this appointment please call [phone] to set up an appointment.      MEDICATIONS  You will receive prescriptions for pain medication (and other applicable medications) on the day of your surgery if you have not already had it arranged to  from the office or sent to your pharmacy ahead of time.   The goal of the multimodal pain regimen is to minimize the amount of narcotic (opioid) medications that you require for postoperative pain control - if possible, try and take the non-narcotic pain medications instead of the narcotic medication unless you feel you need it. You should discuss these medications with your primary care doctor to make sure there are no interactions with any other medications that you are taking, and that your kidneys and/or liver are healthy enough for their use.  Unless otherwise specified, you will be prescribed Hydrocodone-Acetaminophen 5-325 mg (Norco; #20 total) for pain. You may take 1 or 2 tablets every 4-6 hours as needed.   You will also be prescribed Celecoxib 100 mg twice daily (Celebrex; #30 total) for pain as needed. This should be your option for pain control if the pain is not so bad as to require the narcotic medication(s).  You will also be prescribed Gabapentin 100 mg every 8 hours (Neurontin; #21 total) to be taken as scheduled. This medication affects chemicals and nerves  in the body and is another component of the multi-modal pain regimen. Use of this medication will hopefully reduce comsumption of the narcotic pain medications. Its use can contribute to drowsiness, sleepiness, or dizziness in some patients.    Once you are off the Norco and Celebrex, or immediately after surgery, you may start taking Tylenol and Motrin. Unless you have a medical condition that prevents the use of Tylenol or Motrin, you can take 400 mg of Motrin and 650 mg of Tylenol TOGETHER or in a staggered fashion every 6 hours as needed. Do NOT take Norco and Tylenol together (as Norco contains tylenol), and do not take Celebrex and Motrin together.  You will also receive a prescription for a nausea medicine called Ondansetron 4 mg ODT (Zofran; #10 total). You may take one tablet every 8 hours as needed for nausea.  You are strongly encouraged to take a stool softener and/or laxative while taking narcotic pain medicine. You will be prescribed Colace 100 mg (#30 total), a stool softener, which can be taken twice daily for constipation as needed.  Finally, you will receive a prescription for Prilosec 20 mg (Nexium; #14 total) which we recommend to take every morning in order to combat the occurrence of heartburn while on the medications above in the postoperative period.  If you are having pain beyond what is expected from surgery and you need to speak to someone from our office, please do not wait until after 4 PM on weekdays or over the weekend, as we will not be able to address it.  It is hospital policy that we do not refill narcotic pain medicine after hours or on weekends.  Please call [phone] for further instructions.    ANTICOAGULATION (BLOOD THINNERS)  You will not receive any medication for anticoagulation after this surgery.  Ambulation, foot/hand pumps, and movement of the surgical site within the confines of the weightbearing and motion restrictions below are additional ways to reduce the  occurrence of blood clots after surgery.    DRESSING CHANGES, WOUND CARE, AND BATHING  Following surgery you will be placed in a splint. This is a rigid dressing that is designed to keep your elbow from moving after surgery. This splint must be kept clean and dry at all times. To shower, we recommend you cover the splint with a plastic bag and use tape to secure the top so that no water contacts the splint. If it does get wet, please contact our office.  You must not remove the splint, it will be removed at your first post op visit.   When the splint is removed you will also have your sutures removed. Following this you no longer need a dressing on the wound. It is then ok to start showering normally, but do not submerge the wound.  Please do not apply any lotions, creams, or ointments directly to the incisions until 30 days after surgery.   AVOID STEAM ROOMS, SWIMMING POOLS, AND TUBS FOR A FULL 4 WEEKS AFTER THE DATE OF YOUR SURGERY TO AVOID INFECTION.    SWELLING / INFLAMMATION CONTROL  Icing the surgical is very important following surgery.   If you choose to use regular ice packs, please limit icing to 20 minute sessions every 2-3 hours at most to avoid any skin problems.   Icing should be continued for the first several weeks following surgery.   In addition to icing, compression with a support/wrap and elevation of the affected limb above the level of your heart will promote good circulation and reduce both swelling and pain.  It is normal to have swelling and/or bruising around your incisions, or about the surgical extremity after surgery. This will gradually resolve after surgery.  PROLONGED FEVER OVER 102 DEGREES, THICK DRAINAGE, CHEST PAIN, SHORTNESS OF BREATH, OR CALF PAIN SHOULD BE REPORTED IMMEDIATELY.  PLEASE CALL US [phone] IF YOU EXPERIENCE THESE SYMPTOMS.     WEIGHTBEARING STATUS / IMMOBILIZATION  You should not bear any weight with your surgical extremity while it is in the brace/splint,  and until you are instructed as part of the postoperative rehabilitation protocol.    REHABILITATION / PHYSICAL THERAPY  Once the splint has been removed at your first post op visit, a hinged elbow brace will be applied. The purpose of this brace is to allow a safe range of motion while also preventing excessive stiffness.  The brace will only allow a set amount of motion. Typically, after the first 2 weeks, we will allow you to increase that motion slowly. Specific instructions will be provided at your first post op visit.  The brace should be worn morning and night, for 4-6 weeks, but may be removed for bathing.   You will start physical therapy after your first postoperative visit.  Please avoid any lifting, pulling, or sports activity with the operative arm until cleared by Dr. Brady.    DRIVING AFTER SURGERY  The ability for someone to resume driving after surgery is seldom a medical question, but more often a legal question.  Driving with any form of a brace on may be interpreted as driving while impaired.  It is the responsibility of all licensed drivers to drive safely at all times no matter what their permanent or temporary impairment may be.      WORK AFTER SURGERY  Discussions of return to work will depend on the type of work that you perform, and the immediacy of needs to return. This discussion will be had preoperatively and at each visit postoperatively in order to help you, the patient, to get back to what you need to be doing in a timely fashion, while not compromising your surgical outcome.     DIET AFTER SURGERY  A balanced, healthy diet high in proteins is most valuable for the body to utilize during the healing process after surgery. There are otherwise no formal restrictions on your diet after surgery.     OTHER COMMENTS  We recommend to abstain from alcohol or tobacco use in the postoperative period. This is for general health reasons, as combination with the prescribed medications can be  "dangerous, but also to prevent adversely affecting the body's healing response to your surgery.      ***If you have questions and need to speak to the nurse, please send us a message via [facility], or contact us at our triage center at [phone]  ***If you have an emergency after hours or on the weekend call the physician on call at [phone] or [phone]     [facility]  Home Care Following Anesthesia  For 24 hours after surgery:  Get plenty of rest.  A responsible adult must stay with you for at least 24 hours after you leave the surgery center.  Do not drive or use heavy equipment.  If you have weakness or tingling, don't drive or use heavy equipment until this feeling goes away.   Do not drink alcohol.   Avoid strenuous or risky activities.  Ask for help when climbing stairs.  You may feel lightheaded.  IF so, sit for a few minutes before standing.  Have someone help you get up.   If you have nausea (feel sick to your stomach): Drink only clear liquids such as apple juice, ginger ale, broth or 7-Up.  Rest may also help.  Be sure to drink enough fluids.  Move to a regular diet as you feel able.   You may have a slight fever.  Call the doctor if your fever is over 100 F (37.7 C) (taken under the tongue) or lasts longer than 24 hours.  You may have a dry mouth, a sore throat, muscle aches or trouble sleeping. These should go away after 24 hours.  Do not make important or legal decisions.   It is recommended to avoid smoking.        Today you received an Exparel block to numb the nerves near your surgery site.  This is a block using local anesthetic or \"numbing\" medication injected around the nerves to anesthetize or \"numb\" the area supplied by those nerves.  This block is injected into the muscle layer near your surgical site.  This medication may numb the location where you had surgery up to 72 hours.  If your surgical site is an arm or leg you should be careful with your " affected limb, since it is possible to injure your limb without being aware of it due to the numbing.  Until full feeling returns, you should guard against bumping or hitting your limb, and avoid extreme hot or cold temperatures on the skin.  As the block wears off, the feeling will return as a tingling or prickly sensation near your surgical site.  You will experince more discomfort from your incision as the feeling returns.  You may want to take a pain pill (a narcotic or Tylenol if this was prescribed by your surgeon) when you start to experience mild pain before the pain beomes more severe.  If your pain medications do not control your pain, you should notify your surgeon.    Tips for taking pain medications  To get the best pain relief possible, remember these points:  Take pain medications as directed, before pain becomes severe.  Pain medication can upset your stomach: taking it with food may help.  Constipation is a common side effect of pain medication. Drink plenty of  fluids.  Eat foods high in fiber. Take a stool softener if recommended by your doctor or pharmacist.  Do not drink alcohol, drive or operate machinery while taking pain medications.  Ask about other ways to control pain, such as with heat, ice or relaxation.    Tylenol/Acetaminophen Consumption  To help encourage the safe use of acetaminophen, the makers of TYLENOL  have lowered the maximum daily dose for single-ingredient Extra Strength TYLENOL  (acetaminophen) products sold in the [place] from 8 pills per day (4,000 mg) to 6 pills per day (3,000 mg). The dosing interval has also changed from 2 pills every 4-6 hours to 2 pills every 6 hours.  If you feel your pain relief is insufficient, you may take Tylenol/Acetaminophen in addition to your narcotic pain medication.   Be careful not to exceed 3,000 mg of Tylenol/Acetaminophen in a 24 hour period from all sources.  If you are taking extra strength Tylenol/acetaminophen (500 mg), the maximum  dose is 6 tablets in 24 hours.  If you are taking regular strength acetaminophen (325 mg), the maximum dose is 9 tablets in 24 hours.    Call a doctor for any of the following:  Signs of infection (fever, growing tenderness at the surgery site, a large amount of drainage or bleeding, severe pain, foul-smelling drainage, redness, swelling).  It has been over 8 to 10 hours since surgery and you are still not able to urinate (pass water).  Headache for over 24 hours.  Numbness, tingling or weakness the day after surgery (if you had spinal anesthesia).  Signs of Covid-19 infection (temperature over 100 degrees, shortness of breath, cough, loss of taste/smell, generalized body aches, persistent headache, chills, sore throat, nausea/vomiting/diarrhea)  Your doctor is:       Dr. Melissa Brady, Orthopaedics: [phone]               Or dial [phone] and ask for the resident on call for:  Orthopaedics  For emergency care, call the:  [facility] Emergency Department: [phone] (TTY for hearing impaired: [phone])

## 2023-05-24 NOTE — OP NOTE
PATIENT NAME: Joe Mata  1970  [id number]     DATE OF ENCOUNTER:  5/24/23      PREOPERATIVE DIAGNOSIS:  left distal biceps rupture.        POSTOPERATIVE DIAGNOSIS:  left distal biceps rupture.        PROCEDURE:  left distal biceps repair.        STAFF SURGEON:  Melissa Brady MD      ASSISTANTS: RALPH Be          ANESTHESIA:  General endotracheal anesthesia with supplemental regional block.        ESTIMATED BLOOD LOSS:  5 mL.        COMPLICATIONS:  None.        IMPLANTS:  Arthrex button.        BRIEF PATIENT HISTORY: Joe Mata s a 52 -year-old who sustained an injury on 4/14/23.  Patient was seen and exam and MRI was consistent with a distal biceps rupture.  I saw the patient in clinic and we discussed the clinical evidence of a distal biceps rupture.  We discussed that a distal biceps rupture would be anticipated to result in approximately 40% loss of supination strength.  Given the patient's active lifestyle, patient felt that this would be extremely symptomatic and wished to proceed with surgery.  We did discuss the risks and benefits of both nonoperative and operative treatment.  We discussed the expected postoperative restrictions and rehabilitation course. Specific risks discussed include risk of tendon rerupture, neurovascular injury, posterior interosseous nerve injury, lateral antebrachial cutaneous nerve injury, infection, stiffness, pain, hardware related complications, fracture, need for future surgery. The patient had the opportunity for questions to be answered and wished to proceed to surgery.        DESCRIPTION OF PROCEDURE:  The patient was identified in the preoperative area and the correct left elbow was marked for surgery.  A regional block was performed by the anesthesia team for postoperative pain control. The patient was taken to the operating room where  general endotracheal anesthesia was induced by the anesthesia team and was moved to the operating room table in  the supine position with the left arm on a radiolucent arm board.  The left upper extremity was prepped and draped in the usual sterile fashion.  A timeout was held in accordance with hospital policy, confirming correct patient, side, site, procedure and administration of IV antibiotics prior to incision.  I completed a transverse incision approximately 4 cm distal to the elbow crease.  I identified and protected and lateral antebrachial cutaneous nerve. I dissected down to the fascia. I then turned attention to isolating the distal biceps tendon. The tendon was completely torn but had scarred onto the radial tuberosity and lacertus, this was freed.  I debrided the very distal end of the tendon which appeared very thickened and frayed.  I used a grasping FiberLink stitch to prepare the distal biceps tendon and placed a 2nd one.  I then exposed to the radial tuberosity.  Care was taken to keep the arm in supination to protect the posterior interosseous nerve. I confirmed this location fluoroscopically.  I drilled the guide pin into the radius and confirmed acceptable position on fluoroscopy.  I then drilled over this with the 7mm reamer.  I irrigated the wound and then I loaded the biceps button on the suture from the biceps tendon.  The biceps button was passed through to the far cortex and confirmed to be in the appropriate location radiographically.  I then used the tension slide technique to deliver the biceps tendon into the prepared tunnel.  One limb of suture was passed back through the tendon and tied down.  There was excellent fixation on the tendon at this site and the final fluoroscopic images confirmed acceptable implant position.  The wound was copiously irrigated and closed in layered fashion.  Steri-Strips and soft dressing were applied.  The arm was placed into a long arm splint.  The patient was extubated and transported to recovery in stable condition.  There were no apparent intraoperative  complications.  The sponge and needle count were correct at the end of the case. I discussed the findings with the patient's family in the waiting room.           Melissa Brady MD

## 2023-05-25 ENCOUNTER — TELEPHONE (OUTPATIENT)
Dept: ORTHOPEDICS | Facility: CLINIC | Age: 53
End: 2023-05-25
Payer: OTHER GOVERNMENT

## 2023-05-25 DIAGNOSIS — S46.212A RUPTURE OF LEFT DISTAL BICEPS TENDON, INITIAL ENCOUNTER: Primary | ICD-10-CM

## 2023-05-25 NOTE — TELEPHONE ENCOUNTER
[facility] Call Center    Phone Message    May a detailed message be left on voicemail: yes     Reason for Call: Other: Hello, [facility] is calling because they need an OT Hand therapy order placed, in the order make sure it say hand therapy. Thank you, Sammi     Action Taken: Other: [facility]    Travel Screening: Not Applicable

## 2023-05-31 ENCOUNTER — ANCILLARY PROCEDURE (OUTPATIENT)
Dept: GENERAL RADIOLOGY | Facility: CLINIC | Age: 53
End: 2023-05-31
Attending: ORTHOPAEDIC SURGERY
Payer: OTHER GOVERNMENT

## 2023-05-31 ENCOUNTER — OFFICE VISIT (OUTPATIENT)
Dept: ORTHOPEDICS | Facility: CLINIC | Age: 53
End: 2023-05-31
Payer: OTHER GOVERNMENT

## 2023-05-31 DIAGNOSIS — Z47.89 ORTHOPEDIC AFTERCARE: Primary | ICD-10-CM

## 2023-05-31 DIAGNOSIS — S46.212D RUPTURE OF LEFT DISTAL BICEPS TENDON, SUBSEQUENT ENCOUNTER: ICD-10-CM

## 2023-05-31 DIAGNOSIS — Z47.89 ORTHOPEDIC AFTERCARE: ICD-10-CM

## 2023-05-31 PROCEDURE — 73070 X-RAY EXAM OF ELBOW: CPT | Mod: TC | Performed by: RADIOLOGY

## 2023-05-31 PROCEDURE — 99024 POSTOP FOLLOW-UP VISIT: CPT | Performed by: ORTHOPAEDIC SURGERY

## 2023-05-31 NOTE — LETTER
5/31/2023         RE: Joe Mata  [id number] [address] [id number]        Dear Colleague,    Thank you for referring your patient, Joe Mata, to the [facility] [place]. Please see a copy of my visit note below.    CHIEF COMPLAINT: Status post left distal biceps tendon repair    DATE OF SURGERY: 5/24/23    HISTORY OF PRESENT ILLNESS: Joe is an extremely pleasant 52 year-old male who is 7 days status post the above procedure.  Patient has no pain, only took 1 pill of hydrocodone.  Has no complaints today.  EXAM:  Pleasant adult 52 year old in no distress.  Respirations even and unlabored.  Left upper extremity: Incisions clean, dry, and intact. No erythema. No drainage. Sens intact Ax/Musc/Med/Rad/Uln nerves. Motor intact EPL, FPL, and Intrinsics.  Elbow range of motion not tested    DATA REVIEW:  AP and lateral xrays of the left elbow were ordered and reviewed today showing propria button placement for distal biceps repair, no acute fracture    ASSESSMENT:  1.  1 week status post above procedure    PLAN:  I reviewed the intraop findings.  The splint was removed today, patient will be placed in a hinged elbow brace, range of motion will be progressed in the hinged elbow brace under the guidance of occupational therapy.  Patient has occupational therapy scheduled to start next week.  Discussed continue to elevate and ice.  Patient can use over-the-counter analgesics as needed.  I can see the patient back in another 6 to 8 weeks.      Melissa Pace  Orthopedic Surgery Sports Medicine and Shoulder Surgery        Again, thank you for allowing me to participate in the care of your patient.        Sincerely,        MELISSA PACE MD

## 2023-05-31 NOTE — PROGRESS NOTES
CHIEF COMPLAINT: Status post left distal biceps tendon repair    DATE OF SURGERY: 5/24/23    HISTORY OF PRESENT ILLNESS: Joe is an extremely pleasant 52 year-old male who is 7 days status post the above procedure.  Patient has no pain, only took 1 pill of hydrocodone.  Has no complaints today.  EXAM:  Pleasant adult 52 year old in no distress.  Respirations even and unlabored.  Left upper extremity: Incisions clean, dry, and intact. No erythema. No drainage. Sens intact Ax/Musc/Med/Rad/Uln nerves. Motor intact EPL, FPL, and Intrinsics.  Elbow range of motion not tested    DATA REVIEW:  AP and lateral xrays of the left elbow were ordered and reviewed today showing propria button placement for distal biceps repair, no acute fracture    ASSESSMENT:  1.  1 week status post above procedure    PLAN:  I reviewed the intraop findings.  The splint was removed today, patient will be placed in a hinged elbow brace, range of motion will be progressed in the hinged elbow brace under the guidance of occupational therapy.  Patient has occupational therapy scheduled to start next week.  Discussed continue to elevate and ice.  Patient can use over-the-counter analgesics as needed.  I can see the patient back in another 6 to 8 weeks.      Melissa Brady  Orthopedic Surgery Sports Medicine and Shoulder Surgery

## 2023-06-02 ENCOUNTER — HEALTH MAINTENANCE LETTER (OUTPATIENT)
Age: 53
End: 2023-06-02

## 2023-06-06 ENCOUNTER — THERAPY VISIT (OUTPATIENT)
Dept: OCCUPATIONAL THERAPY | Facility: CLINIC | Age: 53
End: 2023-06-06
Attending: ORTHOPAEDIC SURGERY
Payer: OTHER GOVERNMENT

## 2023-06-06 DIAGNOSIS — S46.212A RUPTURE OF LEFT DISTAL BICEPS TENDON, INITIAL ENCOUNTER: ICD-10-CM

## 2023-06-06 PROCEDURE — 97165 OT EVAL LOW COMPLEX 30 MIN: CPT | Mod: GO | Performed by: OCCUPATIONAL THERAPIST

## 2023-06-06 PROCEDURE — 97140 MANUAL THERAPY 1/> REGIONS: CPT | Mod: GO | Performed by: OCCUPATIONAL THERAPIST

## 2023-06-06 PROCEDURE — 97110 THERAPEUTIC EXERCISES: CPT | Mod: GO | Performed by: OCCUPATIONAL THERAPIST

## 2023-06-06 NOTE — PROGRESS NOTES
OCCUPATIONAL THERAPY EVALUATION  Type of Visit: Evaluation    See electronic medical record for Abuse and Falls Screening details.    Subjective  Patient reports he was lifting a tire on  April 4/8/23 and  felt a pop  leading to Distal Biceps tendon repair on 5/24/23. He got the hinge brace put on May  31st .     Presenting condition or subjective complaint: Distal bicep tendon tear  Date of onset: 04/08/23    Relevant medical history:   none related  Dates & types of surgery: Bicep distal tendon  5/24/23    Prior diagnostic imaging/testing results: MRI; X-ray     Prior therapy history for the same diagnosis, illness or injury: No      Prior Level of Function   Transfers: Independent  Ambulation: Independent  ADL: Independent  IADL: Independent    Living Environment  Social support: With a significant other or spouse   Type of home: House   Stairs to enter the home: Yes 10 Is there a railing: Yes   Ramp: No   Stairs inside the home: Yes 10 Is there a railing: Yes   Help at home: Self Cares (home health aide/personal care attendant, family, etc)  Equipment owned:       Employment: Yes [employer]/  Hobbies/Interests: Fish hunt motorcycle, fly airplanes, triathlons    Patient goals for therapy: Use my arm to be able to complete arm fitness test, to be able to carry 40#, to  Be able to do a lot of push ups and throw a 10# ball overhead 9 meters    Pain assessment: See objective evaluation for additional pain details     Objective   ADDITIONAL HISTORY:  Right hand dominant  Patient reports symptoms of pain, stiffness/loss of motion, edema, numbness and tingling   Transportation: drives  Currently working in normal job with restrictions    Functional Outcome Measure:   UEFI 19    PAIN:  Pain Level at Rest: 2/10  Pain Quality: Dull  Pain Frequency: intermittent  Pain Progression: Unchanged    POSTURE: Normal     EDEMA: Mild     SCAR/WOUND: Steri strips in place with dissolvable stitches    SENSATION: mid radial arm, not  formally tested today- just a little numb        ROM: Elbow PROM flexion 120, supination 45, active elbow extension to 45 and pronation to 45        STRENGTH: deferred           Assessment & Plan   CLINICAL IMPRESSIONS   Medical Diagnosis: Rupture of Left distal biceps tendon    Treatment Diagnosis: Decreased ADL's IADL's    Impression/Assessment: Pt is a 52 year old male presenting to Occupational Therapy due to recent distal Left Biceps repair.  The following significant findings have been identified: Impaired ROM, Impaired sensation, Impaired strength and Pain.  These identified deficits interfere with their ability to perform self care tasks, work tasks and recreational activities as compared to previous level of function.     Clinical Decision Making (Complexity):   Assessment of Occupational Performance: 1-3 Performance Deficits  Occupational Performance Limitations: dressing, driving and community mobility, meal preparation and cleanup, work and leisure activities  Clinical Decision Making (Complexity): Low complexity    PLAN OF CARE  Treatment Interventions:   Modalities: Ultrasound  Interventions: Self-Care/Home Management, Therapeutic Exercise, Manual Therapy, Orthotic Fitting/Training    Long Term Goals   OT Goal 1  Goal Identifier: home program  Goal Description: Patient to complete home program with less than 15% cuing by therapist  Rationale: In order to maximize safety and independence with performance of self-care activities  Target Date: 06/27/23  OT Goal 2  Goal Identifier: lifting  Goal Description: Patient to be able to resume lifting up to 40# to pass PT test  Target Date: 08/29/23  OT Goal 3  Goal Identifier: push ups  Goal Description: Patient to be able to start painfree wall push ups x 10  Rationale: In order to maximize safety and independence with performance of self-care activities  Target Date: 08/15/23      Frequency of Treatment: 1x/week  Duration of Treatment: 12 weeks       Education  Assessment: Learner/Method: Patient;Demonstration;Pictures/Video;No Barriers to Learning  Education Comments: written instructions given for home progran     Risks and benefits of evaluation/treatment have been explained.   Patient/Family/caregiver agrees with Plan of Care.     Evaluation Time:    OT Amber Low Complexity Minutes (06837): 25       Signing Clinician: DEBORAH Guallpa/L CHT  Occupational Therapist, Certified Hand Therapist

## 2023-06-13 ENCOUNTER — THERAPY VISIT (OUTPATIENT)
Dept: OCCUPATIONAL THERAPY | Facility: CLINIC | Age: 53
End: 2023-06-13
Attending: ORTHOPAEDIC SURGERY
Payer: OTHER GOVERNMENT

## 2023-06-13 DIAGNOSIS — S46.212A RUPTURE OF LEFT DISTAL BICEPS TENDON, INITIAL ENCOUNTER: Primary | ICD-10-CM

## 2023-06-13 PROCEDURE — 97110 THERAPEUTIC EXERCISES: CPT | Mod: GO | Performed by: OCCUPATIONAL THERAPIST

## 2023-06-13 PROCEDURE — 97140 MANUAL THERAPY 1/> REGIONS: CPT | Mod: GO | Performed by: OCCUPATIONAL THERAPIST

## 2023-06-21 ENCOUNTER — THERAPY VISIT (OUTPATIENT)
Dept: OCCUPATIONAL THERAPY | Facility: CLINIC | Age: 53
End: 2023-06-21
Attending: ORTHOPAEDIC SURGERY
Payer: OTHER GOVERNMENT

## 2023-06-21 DIAGNOSIS — S46.212A RUPTURE OF LEFT DISTAL BICEPS TENDON, INITIAL ENCOUNTER: Primary | ICD-10-CM

## 2023-06-21 PROCEDURE — 97110 THERAPEUTIC EXERCISES: CPT | Mod: GO | Performed by: OCCUPATIONAL THERAPIST

## 2023-06-21 PROCEDURE — 97140 MANUAL THERAPY 1/> REGIONS: CPT | Mod: GO | Performed by: OCCUPATIONAL THERAPIST

## 2023-06-27 ENCOUNTER — THERAPY VISIT (OUTPATIENT)
Dept: OCCUPATIONAL THERAPY | Facility: CLINIC | Age: 53
End: 2023-06-27
Attending: ORTHOPAEDIC SURGERY
Payer: OTHER GOVERNMENT

## 2023-06-27 DIAGNOSIS — S46.212A RUPTURE OF LEFT DISTAL BICEPS TENDON, INITIAL ENCOUNTER: Primary | ICD-10-CM

## 2023-06-27 PROCEDURE — 97140 MANUAL THERAPY 1/> REGIONS: CPT | Mod: GO | Performed by: OCCUPATIONAL THERAPIST

## 2023-06-27 PROCEDURE — 97110 THERAPEUTIC EXERCISES: CPT | Mod: GO | Performed by: OCCUPATIONAL THERAPIST

## 2023-07-11 ENCOUNTER — THERAPY VISIT (OUTPATIENT)
Dept: OCCUPATIONAL THERAPY | Facility: CLINIC | Age: 53
End: 2023-07-11
Attending: ORTHOPAEDIC SURGERY
Payer: OTHER GOVERNMENT

## 2023-07-11 DIAGNOSIS — S46.212A RUPTURE OF LEFT DISTAL BICEPS TENDON, INITIAL ENCOUNTER: Primary | ICD-10-CM

## 2023-07-11 PROCEDURE — 97140 MANUAL THERAPY 1/> REGIONS: CPT | Mod: GO | Performed by: OCCUPATIONAL THERAPIST

## 2023-07-11 PROCEDURE — 97110 THERAPEUTIC EXERCISES: CPT | Mod: GO | Performed by: OCCUPATIONAL THERAPIST

## 2023-07-12 ENCOUNTER — OFFICE VISIT (OUTPATIENT)
Dept: ORTHOPEDICS | Facility: CLINIC | Age: 53
End: 2023-07-12
Payer: OTHER GOVERNMENT

## 2023-07-12 VITALS
WEIGHT: 206 LBS | HEIGHT: 72 IN | BODY MASS INDEX: 27.9 KG/M2 | SYSTOLIC BLOOD PRESSURE: 120 MMHG | DIASTOLIC BLOOD PRESSURE: 74 MMHG

## 2023-07-12 DIAGNOSIS — Z47.89 ORTHOPEDIC AFTERCARE: Primary | ICD-10-CM

## 2023-07-12 PROCEDURE — 99024 POSTOP FOLLOW-UP VISIT: CPT | Performed by: ORTHOPAEDIC SURGERY

## 2023-07-12 NOTE — LETTER
7/12/2023         RE: Joe Mata  [address] [address]        Dear Colleague,    Thank you for referring your patient, Joe Mata, to the [facility] [place]. Please see a copy of my visit note below.    CHIEF COMPLAINT: Status post left distal biceps tendon repair    DATE OF SURGERY: 5/24/23    HISTORY OF PRESENT ILLNESS:   Joe is an extremely pleasant 52 year-old male who is 6 weeks status post the above procedure.  Patient is doing well, has no pain, working with occupational therapy and has near full range of motion, wearing the brace.    EXAM:  Pleasant adult 52 year old in no distress.  Respirations even and unlabored.  Left upper extremity: Incisions clean, dry, and intact. No erythema. No drainage. Sens intact Ax/Musc/Med/Rad/Uln nerves. Motor intact EPL, FPL, and Intrinsics.    range of motion from 10 degrees short of full extension to fully able to touch the shoulder, full supination and pronation      ASSESSMENT:  1.  6 week status post above procedure    PLAN:  I discussed with the patient that things are appropriately progressing, patient has near full range of motion.  We discussed continuing with occupational therapy to achieve full range of motion and slowly starting to wean out of the brace.  Discussed no strengthening until the 3-month time period.  Patient is going to continue with occupational therapy.  Discussed coffee cup weightbearing.  Patient will follow-up in 2 to 3 months.      Melissa Pace  Orthopedic Surgery Sports Medicine and Shoulder Surgery        Again, thank you for allowing me to participate in the care of your patient.        Sincerely,        MELISSA PACE MD

## 2023-07-12 NOTE — PROGRESS NOTES
CHIEF COMPLAINT: Status post left distal biceps tendon repair    DATE OF SURGERY: 5/24/23    HISTORY OF PRESENT ILLNESS:   Joe is an extremely pleasant 52 year-old male who is 6 weeks status post the above procedure.  Patient is doing well, has no pain, working with occupational therapy and has near full range of motion, wearing the brace.    EXAM:  Pleasant adult 52 year old in no distress.  Respirations even and unlabored.  Left upper extremity: Incisions clean, dry, and intact. No erythema. No drainage. Sens intact Ax/Musc/Med/Rad/Uln nerves. Motor intact EPL, FPL, and Intrinsics.    range of motion from 10 degrees short of full extension to fully able to touch the shoulder, full supination and pronation      ASSESSMENT:  1.  6 week status post above procedure    PLAN:  I discussed with the patient that things are appropriately progressing, patient has near full range of motion.  We discussed continuing with occupational therapy to achieve full range of motion and slowly starting to wean out of the brace.  Discussed no strengthening until the 3-month time period.  Patient is going to continue with occupational therapy.  Discussed coffee cup weightbearing.  Patient will follow-up in 2 to 3 months.      Melissa Brady  Orthopedic Surgery Sports Medicine and Shoulder Surgery

## 2023-07-18 ENCOUNTER — THERAPY VISIT (OUTPATIENT)
Dept: OCCUPATIONAL THERAPY | Facility: CLINIC | Age: 53
End: 2023-07-18
Attending: ORTHOPAEDIC SURGERY
Payer: OTHER GOVERNMENT

## 2023-07-18 DIAGNOSIS — S46.212A RUPTURE OF LEFT DISTAL BICEPS TENDON, INITIAL ENCOUNTER: Primary | ICD-10-CM

## 2023-07-18 PROCEDURE — 97035 APP MDLTY 1+ULTRASOUND EA 15: CPT | Mod: GO | Performed by: OCCUPATIONAL THERAPIST

## 2023-07-18 PROCEDURE — 97110 THERAPEUTIC EXERCISES: CPT | Mod: GO | Performed by: OCCUPATIONAL THERAPIST

## 2023-07-31 ENCOUNTER — THERAPY VISIT (OUTPATIENT)
Dept: OCCUPATIONAL THERAPY | Facility: CLINIC | Age: 53
End: 2023-07-31
Attending: ORTHOPAEDIC SURGERY
Payer: OTHER GOVERNMENT

## 2023-07-31 DIAGNOSIS — S46.212A RUPTURE OF LEFT DISTAL BICEPS TENDON, INITIAL ENCOUNTER: Primary | ICD-10-CM

## 2023-07-31 PROCEDURE — 97110 THERAPEUTIC EXERCISES: CPT | Mod: GO | Performed by: OCCUPATIONAL THERAPIST

## 2023-09-05 ENCOUNTER — IMMUNIZATION (OUTPATIENT)
Dept: FAMILY MEDICINE | Facility: CLINIC | Age: 53
End: 2023-09-05
Payer: OTHER GOVERNMENT

## 2023-09-05 ENCOUNTER — THERAPY VISIT (OUTPATIENT)
Dept: OCCUPATIONAL THERAPY | Facility: CLINIC | Age: 53
End: 2023-09-05
Attending: ORTHOPAEDIC SURGERY
Payer: OTHER GOVERNMENT

## 2023-09-05 DIAGNOSIS — S46.212A RUPTURE OF LEFT DISTAL BICEPS TENDON, INITIAL ENCOUNTER: Primary | ICD-10-CM

## 2023-09-05 DIAGNOSIS — Z23 NEED FOR PROPHYLACTIC VACCINATION AND INOCULATION AGAINST INFLUENZA: Primary | ICD-10-CM

## 2023-09-05 PROCEDURE — 97110 THERAPEUTIC EXERCISES: CPT | Mod: GO | Performed by: OCCUPATIONAL THERAPIST

## 2023-09-05 PROCEDURE — 99207 PR NO CHARGE NURSE ONLY: CPT

## 2023-09-05 PROCEDURE — 90682 RIV4 VACC RECOMBINANT DNA IM: CPT

## 2023-09-05 PROCEDURE — 90471 IMMUNIZATION ADMIN: CPT

## 2023-09-20 ENCOUNTER — THERAPY VISIT (OUTPATIENT)
Dept: OCCUPATIONAL THERAPY | Facility: CLINIC | Age: 53
End: 2023-09-20
Attending: ORTHOPAEDIC SURGERY
Payer: OTHER GOVERNMENT

## 2023-09-20 DIAGNOSIS — S46.212A RUPTURE OF LEFT DISTAL BICEPS TENDON, INITIAL ENCOUNTER: Primary | ICD-10-CM

## 2023-09-20 PROCEDURE — 97110 THERAPEUTIC EXERCISES: CPT | Mod: GO | Performed by: OCCUPATIONAL THERAPIST

## 2023-09-20 NOTE — PROGRESS NOTES
CHIEF COMPLAINT: Status post left distal biceps tendon repair    DATE OF SURGERY: 5/24/23    HISTORY OF PRESENT ILLNESS:   Joe is an extremely pleasant 52 year-old male who is 18 weeks status post the above procedure. Patient is doing well and has been doing PT with no concerns.       EXAM:  Pleasant adult 52 year old in no distress.  Respirations even and unlabored.  Left upper extremity: Incisions clean, dry, and intact. No erythema. No drainage. Sens intact Ax/Musc/Med/Rad/Uln nerves. Motor intact EPL, FPL, and Intrinsics.    Full range of motion about the elbow from full extension to able to touch the shoulder, full pronation and supination, intact biceps tendon    ASSESSMENT:  1.  18 week status post above procedure    PLAN:  I discussed with the patient that things are appropriately progressing, we discussed the plan to continue to gradually strengthen.  Patient is going to discontinue formal therapy and transition to home exercises only and continue to gradually increase strength training.  Patient can follow-up as needed.      Melissa Brady  Orthopedic Surgery Sports Medicine and Shoulder Surgery

## 2023-09-28 ENCOUNTER — OFFICE VISIT (OUTPATIENT)
Dept: ORTHOPEDICS | Facility: CLINIC | Age: 53
End: 2023-09-28
Payer: OTHER GOVERNMENT

## 2023-09-28 DIAGNOSIS — Z47.89 ORTHOPEDIC AFTERCARE: Primary | ICD-10-CM

## 2023-09-28 PROCEDURE — 99212 OFFICE O/P EST SF 10 MIN: CPT | Performed by: ORTHOPAEDIC SURGERY

## 2023-09-28 NOTE — LETTER
9/28/2023         RE: Joe Mata  [address] [address]        Dear Colleague,    Thank you for referring your patient, Joe Mata, to the [facility]. Please see a copy of my visit note below.    CHIEF COMPLAINT: Status post left distal biceps tendon repair    DATE OF SURGERY: 5/24/23    HISTORY OF PRESENT ILLNESS:   Joe is an extremely pleasant 52 year-old male who is 18 weeks status post the above procedure. Patient is doing well and has been doing PT with no concerns.       EXAM:  Pleasant adult 52 year old in no distress.  Respirations even and unlabored.  Left upper extremity: Incisions clean, dry, and intact. No erythema. No drainage. Sens intact Ax/Musc/Med/Rad/Uln nerves. Motor intact EPL, FPL, and Intrinsics.    Full range of motion about the elbow from full extension to able to touch the shoulder, full pronation and supination, intact biceps tendon    ASSESSMENT:  1.  18 week status post above procedure    PLAN:  I discussed with the patient that things are appropriately progressing, we discussed the plan to continue to gradually strengthen.  Patient is going to discontinue formal therapy and transition to home exercises only and continue to gradually increase strength training.  Patient can follow-up as needed.      Melissa Pace  Orthopedic Surgery Sports Medicine and Shoulder Surgery      Again, thank you for allowing me to participate in the care of your patient.        Sincerely,        MELISSA PACE MD

## 2023-10-04 PROBLEM — S46.212A RUPTURE OF LEFT DISTAL BICEPS TENDON, INITIAL ENCOUNTER: Status: RESOLVED | Noted: 2023-05-16 | Resolved: 2023-10-04

## 2023-11-28 PROBLEM — Z53.9 ERRONEOUS ENCOUNTER--DISREGARD: Status: ACTIVE | Noted: 2023-11-28

## 2023-12-05 ENCOUNTER — OFFICE VISIT (OUTPATIENT)
Dept: URGENT CARE | Facility: URGENT CARE | Age: 53
End: 2023-12-05
Payer: OTHER GOVERNMENT

## 2023-12-05 ENCOUNTER — ANCILLARY PROCEDURE (OUTPATIENT)
Dept: GENERAL RADIOLOGY | Facility: CLINIC | Age: 53
End: 2023-12-05
Attending: NURSE PRACTITIONER
Payer: OTHER GOVERNMENT

## 2023-12-05 VITALS
WEIGHT: 227 LBS | SYSTOLIC BLOOD PRESSURE: 122 MMHG | HEART RATE: 63 BPM | BODY MASS INDEX: 30.79 KG/M2 | TEMPERATURE: 97.1 F | OXYGEN SATURATION: 97 % | DIASTOLIC BLOOD PRESSURE: 77 MMHG | RESPIRATION RATE: 16 BRPM

## 2023-12-05 DIAGNOSIS — S69.92XA INJURY OF LEFT THUMB, INITIAL ENCOUNTER: Primary | ICD-10-CM

## 2023-12-05 PROCEDURE — 99213 OFFICE O/P EST LOW 20 MIN: CPT | Performed by: NURSE PRACTITIONER

## 2023-12-05 PROCEDURE — 73130 X-RAY EXAM OF HAND: CPT | Mod: TC | Performed by: RADIOLOGY

## 2023-12-05 ASSESSMENT — ENCOUNTER SYMPTOMS
FEVER: 0
CHILLS: 0

## 2023-12-06 NOTE — PROGRESS NOTES
Assessment & Plan     Injury of left thumb, initial encounter  - XR Hand Left G/E 3 Views  Narrative & Impression   EXAM: XR HAND LEFT G/E 3 VIEWS  LOCATION: [facility]  DATE: 12/5/2023  INDICATION: Pain after injury  COMPARISON: None.                                                              IMPRESSION: Degenerative change at the first MCP joint. Slight ulnar subluxation at the level of the joint. No evidence for fracture or dislocation.     - Orthopedic  Referral  - Wrist/Arm Supplies Order Wrist Brace; Left; with thumb spica  Return in about 1 day (around 12/6/2023). If increased pain     Mary Ponce NP  [facility]    Fabi Diaz is a 52 year old male who presents to clinic today for the following health issues: Patient was working on deer hide breaking down the fibers when all of a sudden he heard a pop with a pain sensation. Patient noted decrease alignment, tenderness, decreased range of motion and stiffness. Patient states the thumb pain increases with repetitive motion. Patient states this is the first time this happened to him.   Chief Complaint   Patient presents with    thumb displacement     Evaluate left thumb for possible displacement, moving fabric around today and hurt a pop and feels weird.     MS Injury/Pain  Onset of symptoms was 1 day(s) ago.  Location: left thumb  Context:       The injury happened while crush the deer fiber skin       Mechanism:       Patient experienced heard pop with pain sensation   Course of symptoms is same.    Severity moderately severe  Current and Associated symptoms: Pain, Tenderness, Decreased range of motion, and Stiffness  Denies  Bruising, Warmth, and Redness  Aggravating Factors: movement and repetitive motion  Therapies to improve symptoms include: none  This is the first time this type of problem has occurred for this patient.     Review of Systems   Constitutional:  Negative  for chills and fever.   Skin:  Negative for rash.           Objective    /77   Pulse 63   Temp 97.1  F (36.2  C) (Tympanic)   Resp 16   Wt 103 kg (227 lb)   SpO2 97%   BMI 30.79 kg/m    Physical Exam  Vitals and nursing note reviewed.   Constitutional:       Appearance: Normal appearance.   HENT:      Head: Normocephalic.   Musculoskeletal:      Left wrist: No tenderness or bony tenderness. Normal range of motion. Normal pulse.      Left hand: Deformity and bony tenderness present. Normal range of motion. Normal sensation. Normal pulse.      Comments: Left thumb compared to right looks is not the same with slight misalignment    Neurological:      Mental Status: He is alert.

## 2023-12-18 NOTE — PROGRESS NOTES
"ASSESSMENT & PLAN    Joe was seen today for pain.    Diagnoses and all orders for this visit:    Injury of left thumb, initial encounter  -     Orthopedic  Referral  -     Cancel: XR Finger Right G/E 2 Views; Future      This issue is acute and Unchanged.        ICD-10-CM    1. Injury of left thumb, initial encounter  S69.92XA Orthopedic  Referral     CANCELED: XR Finger Right G/E 2 Views        Patient Instructions   We discussed these other possible diagnosis: Left thumb injury, likely radial collateral ligament injury given exam. Likely some underlying arthritis as well.    Plan:  - Today's Plan of Care:  Discussed bracing  Discussed activity considerations and other supportive care including Ice/Heat, OTC and other topical medications as needed.    -We also discussed other future treatment options:  Referral to Hand Surgery  MRI imaging    Follow Up: to be determined - will call    Concerning signs and symptoms were reviewed and all questions were answered at this time.    Jane Longoria MD [facility]  Sports Medicine Physician  [facility]    -----  Chief Complaint   Patient presents with    Left Thumb - Pain       SUBJECTIVE  Joe Mata is a/an 52 year old male who is seen as an Urgent Care referral for evaluation of left thumb injury .     The patient is seen by themselves.  The patient is Right handed    Onset: 3 week(s) ago. Patient describes injury as stretching leather and tanning leather, pulling on it, he felt a pop in the MCP joint.    Location of Pain: left thump; MCP   Worsened by: nothing   Better with: unsure  Treatments tried: previous imaging (xray) and casting/splinting/bracing  Associated symptoms: redness, hypermobility, and it looks \"deformed\"    Orthopedic/Surgical history: Yes - likely hand arthritis      REVIEW OF SYSTEMS:  Review of Systems    OBJECTIVE:  There were no vitals taken for this visit.   General: healthy, alert and in no distress  Skin: no " suspicious lesions or rash.  CV: distal perfusion intact   Resp: normal respiratory effort without conversational dyspnea   Psych: normal mood and affect  Gait: NORMAL  Neuro: Normal light sensory exam of upper extremity    Left Wrist and Hand exam    Inspection:       Some deformity left thumb MCP joint, various arthritis deformities    Tender:       Left thumb MCP joint    Non Tender:       Remainder of the Wrist and Hand left    ROM:       Full and symmetric active and passive range of motion of the forearm, wrist and digits bilateral    Strength:       5/5 strength in the muscles of the hand, wrist and forearm left    Special Tests:       Unstable with thumb radial collateral ligament testing, UCL is stable    Neurovascular:       2+ radial pulses bilaterally with brisk capillary refill and      normal sensation to light touch in the radial, median and ulnar nerve distributions      RADIOLOGY:  Final results and radiologist's interpretation, available in the [facility] health record.  Images were reviewed with the patient in the office today.  My personal interpretation of the performed imaging:    3 XR views of left thumb reviewed and compared to left hand XR 12/5/2023: no acute bony abnormality, mild degenerative change, ulnar subluxation of thumb MCP joint  - will follow official read    Review of prior external note(s) from -   Review of the result(s) of each unique test - XR

## 2023-12-20 ENCOUNTER — ANCILLARY PROCEDURE (OUTPATIENT)
Dept: GENERAL RADIOLOGY | Facility: CLINIC | Age: 53
End: 2023-12-20
Attending: PEDIATRICS
Payer: OTHER GOVERNMENT

## 2023-12-20 ENCOUNTER — OFFICE VISIT (OUTPATIENT)
Dept: ORTHOPEDICS | Facility: CLINIC | Age: 53
End: 2023-12-20
Attending: NURSE PRACTITIONER
Payer: OTHER GOVERNMENT

## 2023-12-20 DIAGNOSIS — S69.92XA INJURY OF LEFT THUMB, INITIAL ENCOUNTER: ICD-10-CM

## 2023-12-20 PROCEDURE — 99213 OFFICE O/P EST LOW 20 MIN: CPT | Performed by: PEDIATRICS

## 2023-12-20 PROCEDURE — 73140 X-RAY EXAM OF FINGER(S): CPT | Mod: TC | Performed by: RADIOLOGY

## 2023-12-20 NOTE — PATIENT INSTRUCTIONS
We discussed these other possible diagnosis: Left thumb injury, likely radial collateral ligament injury given exam. Likely some underlying arthritis as well.    Plan:  - Today's Plan of Care:  Discussed bracing  Discussed activity considerations and other supportive care including Ice/Heat, OTC and other topical medications as needed.    -We also discussed other future treatment options:  Referral to Hand Surgery  MRI imaging    Follow Up: to be determined - will call    If you have any further questions for your physician or physician s care team you can call [phone] and use option 3 to leave a voice message.

## 2023-12-20 NOTE — LETTER
12/20/2023         RE: Joe Mata  [address] [address]        Dear Colleague,    Thank you for referring your patient, Joe Mata, to the [facility] [place]. Please see a copy of my visit note below.    ASSESSMENT & PLAN    Joe was seen today for pain.    Diagnoses and all orders for this visit:    Injury of left thumb, initial encounter  -     Orthopedic  Referral  -     Cancel: XR Finger Right G/E 2 Views; Future      This issue is acute and Unchanged.        ICD-10-CM    1. Injury of left thumb, initial encounter  S69.92XA Orthopedic  Referral     CANCELED: XR Finger Right G/E 2 Views        Patient Instructions   We discussed these other possible diagnosis: Left thumb injury, likely radial collateral ligament injury given exam. Likely some underlying arthritis as well.    Plan:  - Today's Plan of Care:  Discussed bracing  Discussed activity considerations and other supportive care including Ice/Heat, OTC and other topical medications as needed.    -We also discussed other future treatment options:  Referral to Hand Surgery  MRI imaging    Follow Up: to be determined - will call    Concerning signs and symptoms were reviewed and all questions were answered at this time.    Jnae Longoria MD [facility]  Sports Medicine Physician  [facility]    -----  Chief Complaint   Patient presents with     Left Thumb - Pain       SUBJECTIVE  Joe Mata is a/an 52 year old male who is seen as an Urgent Care referral for evaluation of left thumb injury .     The patient is seen by themselves.  The patient is Right handed    Onset: 3 week(s) ago. Patient describes injury as stretching leather and tanning leather, pulling on it, he felt a pop in the MCP joint.    Location of Pain: left thump; MCP   Worsened by: nothing   Better with: unsure  Treatments tried: previous imaging (xray) and casting/splinting/bracing  Associated  "symptoms: redness, hypermobility, and it looks \"deformed\"    Orthopedic/Surgical history: Yes - likely hand arthritis      REVIEW OF SYSTEMS:  Review of Systems    OBJECTIVE:  There were no vitals taken for this visit.   General: healthy, alert and in no distress  Skin: no suspicious lesions or rash.  CV: distal perfusion intact   Resp: normal respiratory effort without conversational dyspnea   Psych: normal mood and affect  Gait: NORMAL  Neuro: Normal light sensory exam of upper extremity    Left Wrist and Hand exam    Inspection:       Some deformity left thumb MCP joint, various arthritis deformities    Tender:       Left thumb MCP joint    Non Tender:       Remainder of the Wrist and Hand left    ROM:       Full and symmetric active and passive range of motion of the forearm, wrist and digits bilateral    Strength:       5/5 strength in the muscles of the hand, wrist and forearm left    Special Tests:       Unstable with thumb radial collateral ligament testing, UCL is stable    Neurovascular:       2+ radial pulses bilaterally with brisk capillary refill and      normal sensation to light touch in the radial, median and ulnar nerve distributions      RADIOLOGY:  Final results and radiologist's interpretation, available in the [facility] health record.  Images were reviewed with the patient in the office today.  My personal interpretation of the performed imaging:    3 XR views of left thumb reviewed and compared to left hand XR 12/5/2023: no acute bony abnormality, mild degenerative change, ulnar subluxation of thumb MCP joint  - will follow official read    Review of prior external note(s) from -   Review of the result(s) of each unique test - XR             Again, thank you for allowing me to participate in the care of your patient.        Sincerely,        Jane Longoria MD  "

## 2024-04-01 PROBLEM — Z53.9 ERRONEOUS ENCOUNTER--DISREGARD: Status: RESOLVED | Noted: 2023-11-28 | Resolved: 2024-04-01

## 2024-06-22 ENCOUNTER — HEALTH MAINTENANCE LETTER (OUTPATIENT)
Age: 54
End: 2024-06-22

## 2025-07-12 ENCOUNTER — HEALTH MAINTENANCE LETTER (OUTPATIENT)
Age: 55
End: 2025-07-12

## (undated) DEVICE — Device

## (undated) DEVICE — ESU GROUND PAD ADULT W/CORD E7507

## (undated) DEVICE — CAST PADDING 4" STERILE 9044S

## (undated) DEVICE — GLOVE BIOGEL PI MICRO SZ 6.5 48565

## (undated) DEVICE — ESU PENCIL SMOKE EVAC W/ROCKER SWITCH 0703-047-000

## (undated) DEVICE — DRAPE STERI U 1015

## (undated) DEVICE — DRAPE IOBAN INCISE 23X17" 6650EZ

## (undated) DEVICE — SUCTION MANIFOLD NEPTUNE 2 SYS 1 PORT 702-025-000

## (undated) DEVICE — DRSG STERI STRIP 1/2X4" R1547

## (undated) DEVICE — PREP CHLORAPREP 26ML TINTED ORANGE  260815

## (undated) DEVICE — LINEN ORTHO PACK 5446

## (undated) DEVICE — SOL NACL 0.9% IRRIG 500ML BOTTLE 2F7123

## (undated) DEVICE — STRAP STIRRUP W/SLIP 30187-030

## (undated) DEVICE — BLADE KNIFE SURG 10 371110

## (undated) DEVICE — PACK HAND CUSTOM ASC

## (undated) DEVICE — GLOVE BIOGEL PI MICRO INDICATOR UNDERGLOVE SZ 6.5 48965

## (undated) RX ORDER — HYDROMORPHONE HYDROCHLORIDE 1 MG/ML
INJECTION, SOLUTION INTRAMUSCULAR; INTRAVENOUS; SUBCUTANEOUS
Status: DISPENSED
Start: 2023-05-24

## (undated) RX ORDER — OXYCODONE HYDROCHLORIDE 5 MG/1
TABLET ORAL
Status: DISPENSED
Start: 2023-05-24

## (undated) RX ORDER — ACETAMINOPHEN 325 MG/1
TABLET ORAL
Status: DISPENSED
Start: 2023-05-24

## (undated) RX ORDER — FENTANYL CITRATE 50 UG/ML
INJECTION, SOLUTION INTRAMUSCULAR; INTRAVENOUS
Status: DISPENSED
Start: 2023-05-24

## (undated) RX ORDER — ONDANSETRON 2 MG/ML
INJECTION INTRAMUSCULAR; INTRAVENOUS
Status: DISPENSED
Start: 2023-05-24

## (undated) RX ORDER — DEXAMETHASONE SODIUM PHOSPHATE 4 MG/ML
INJECTION, SOLUTION INTRA-ARTICULAR; INTRALESIONAL; INTRAMUSCULAR; INTRAVENOUS; SOFT TISSUE
Status: DISPENSED
Start: 2023-05-24

## (undated) RX ORDER — CEFAZOLIN SODIUM 2 G/50ML
SOLUTION INTRAVENOUS
Status: DISPENSED
Start: 2023-05-24

## (undated) RX ORDER — EPHEDRINE SULFATE 50 MG/ML
INJECTION, SOLUTION INTRAMUSCULAR; INTRAVENOUS; SUBCUTANEOUS
Status: DISPENSED
Start: 2023-05-24

## (undated) RX ORDER — PROPOFOL 10 MG/ML
INJECTION, EMULSION INTRAVENOUS
Status: DISPENSED
Start: 2023-05-24